# Patient Record
Sex: FEMALE | Race: WHITE | NOT HISPANIC OR LATINO | Employment: OTHER | URBAN - METROPOLITAN AREA
[De-identification: names, ages, dates, MRNs, and addresses within clinical notes are randomized per-mention and may not be internally consistent; named-entity substitution may affect disease eponyms.]

---

## 2017-11-27 ENCOUNTER — OFFICE VISIT (OUTPATIENT)
Dept: OTOLARYNGOLOGY | Facility: CLINIC | Age: 72
End: 2017-11-27
Payer: MEDICARE

## 2017-11-27 VITALS — BODY MASS INDEX: 36.9 KG/M2 | WEIGHT: 208.31 LBS

## 2017-11-27 DIAGNOSIS — Z79.01 ANTICOAGULATED: ICD-10-CM

## 2017-11-27 DIAGNOSIS — H93.13 TINNITUS OF BOTH EARS: ICD-10-CM

## 2017-11-27 DIAGNOSIS — R04.0 EPISTAXIS: Primary | ICD-10-CM

## 2017-11-27 PROCEDURE — 99213 OFFICE O/P EST LOW 20 MIN: CPT | Mod: PBBFAC | Performed by: PHYSICIAN ASSISTANT

## 2017-11-27 PROCEDURE — 99999 PR PBB SHADOW E&M-EST. PATIENT-LVL III: CPT | Mod: PBBFAC,,, | Performed by: PHYSICIAN ASSISTANT

## 2017-11-27 PROCEDURE — 99204 OFFICE O/P NEW MOD 45 MIN: CPT | Mod: S$PBB,,, | Performed by: PHYSICIAN ASSISTANT

## 2017-11-27 RX ORDER — ISOSORBIDE MONONITRATE 30 MG/1
TABLET, EXTENDED RELEASE ORAL
COMMUNITY
Start: 2015-12-07 | End: 2021-04-14

## 2017-11-27 RX ORDER — IMIPRAMINE HYDROCHLORIDE 50 MG/1
50 TABLET, FILM COATED ORAL NIGHTLY
COMMUNITY
End: 2021-04-14

## 2017-11-27 RX ORDER — LEVOCETIRIZINE DIHYDROCHLORIDE 5 MG/1
5 TABLET, FILM COATED ORAL NIGHTLY
COMMUNITY

## 2017-11-27 RX ORDER — MUPIROCIN 20 MG/G
OINTMENT TOPICAL 2 TIMES DAILY
Qty: 15 G | Refills: 3 | Status: SHIPPED | OUTPATIENT
Start: 2017-11-27 | End: 2017-12-07

## 2017-11-27 RX ORDER — LISINOPRIL AND HYDROCHLOROTHIAZIDE 12.5; 2 MG/1; MG/1
1 TABLET ORAL DAILY
COMMUNITY
End: 2021-04-14

## 2017-11-27 RX ORDER — CLOPIDOGREL BISULFATE 75 MG/1
1 TABLET ORAL DAILY
COMMUNITY
Start: 2015-09-01

## 2017-11-27 RX ORDER — ESTRADIOL 0.5 MG/1
0.5 TABLET ORAL DAILY
COMMUNITY
End: 2021-04-14

## 2017-11-27 NOTE — PROGRESS NOTES
"Subjective:       Patient ID: Alexandrea Butt is a 72 y.o. female.    Chief Complaint: Epistaxis    Patient is a very pleasant 72 year old female here to see me today for the first time for evaluation of epistaxis.  Epistaxis-    History of intermittent bleeding several months or years:  YES past 12 months  Laterality:  bilateral  Current bleeding has been going on for - last episode yesterday  Bleeding isolated to blood in mucus or on tissues:  Yes  Bleeding more than 1/4 cup of blood at any isolated bleed:  No   Ever required a blood transfusion due to nose bleeds:  NO  Primarily Posterior Bleeding:  No  History of coagulation disorders/bleeding when having teeth cleaning:  No  Currently on anticoagulant medications:   YES aspirin and Plavix  Blood pressure:   BP Readings from Last 3 Encounters:  02/11/15 : 132/79  01/07/15 : (!) 158/84    Denies nasal trauma.  Denies previous nasal packing or cauterization.  She's been using Flonase daily for past six months.  She also complains of tinnitus AU for years, "like music."  Denies ear pain or drainage.  Denies previous otologic surgery.  Denies family history of hearing loss.  Denies hearing loss; denies having a better hearing ear; denies loud noise exposure.  She denies dizziness but says she feels "empty headed" at times.  She does not smoke.            Review of Systems   Constitutional: Negative for activity change, appetite change and fever.   HENT: Positive for congestion (sometimes), nosebleeds, postnasal drip (occasional with throat clearing), rhinorrhea and tinnitus (AU for years). Negative for ear discharge, ear pain, hearing loss, sinus pain, sinus pressure, sneezing, sore throat, trouble swallowing and voice change.    Eyes: Negative for discharge.   Respiratory: Positive for cough (sometimes) and shortness of breath (with exertion).    Cardiovascular: Negative for chest pain and palpitations.   Gastrointestinal: Negative for diarrhea, nausea and vomiting. "   Musculoskeletal: Positive for arthralgias. Negative for neck pain.   Allergic/Immunologic: Negative for food allergies.   Neurological: Positive for headaches (occasional, better with Lisinopril). Negative for light-headedness.   Hematological: Negative for adenopathy.   Psychiatric/Behavioral: Positive for sleep disturbance.       Objective:      Physical Exam   Constitutional: She is oriented to person, place, and time. She appears well-developed and well-nourished. No distress.   HENT:   Head: Normocephalic and atraumatic.   Right Ear: Tympanic membrane, external ear and ear canal normal.   Left Ear: Tympanic membrane, external ear and ear canal normal.   Nose: Nose normal. No mucosal edema, rhinorrhea, nasal deformity or septal deviation. No epistaxis. Right sinus exhibits no maxillary sinus tenderness and no frontal sinus tenderness. Left sinus exhibits no maxillary sinus tenderness and no frontal sinus tenderness.   Mouth/Throat: Uvula is midline, oropharynx is clear and moist and mucous membranes are normal. Mucous membranes are not pale and not dry. Normal dentition. No oropharyngeal exudate or posterior oropharyngeal erythema.   Eyes: Conjunctivae, EOM and lids are normal. Pupils are equal, round, and reactive to light. Right eye exhibits no chemosis. Left eye exhibits no chemosis. Right conjunctiva is not injected. Left conjunctiva is not injected. No scleral icterus. Right eye exhibits normal extraocular motion and no nystagmus. Left eye exhibits normal extraocular motion and no nystagmus.   Neck: Trachea normal and phonation normal. No tracheal tenderness present. No tracheal deviation present. No thyroid mass and no thyromegaly present.   Cardiovascular: Normal rate and intact distal pulses.    Pulmonary/Chest: Effort normal. No stridor. No respiratory distress.   Abdominal: She exhibits no distension.   Lymphadenopathy:        Head (right side): No submental, no submandibular, no preauricular and no  posterior auricular adenopathy present.        Head (left side): No submental, no submandibular, no preauricular and no posterior auricular adenopathy present.     She has no cervical adenopathy.   Neurological: She is alert and oriented to person, place, and time. No cranial nerve deficit.   Skin: Skin is warm and dry. No rash noted. No erythema.   Psychiatric: She has a normal mood and affect. Her behavior is normal.       Assessment:       1. Epistaxis    2. Anticoagulated    3. Tinnitus of both ears        Plan:           Epistaxis: She was given a handout detailing different strategies to help increase her nasal moisture, including the use of saline spray throughout the day and a humidifier at night. In addition, I gave the patient a prescription for Bactroban to be used twice daily for two weeks, and she may use Afrin as needed for active bleeding.  Discussed that nosebleeds are more common with patients on anticoagulation.  Recommend she maintain intranasal moisture and adequate blood pressure control and RTC with any further epistaxis.    Tinnitus:  Recommend scheduling an audiogram at her convenience and I'll review those results once available.  We also discussed that tinnitus is most often caused by a hearing loss, and that as the hair cells are damaged, either genetic or as a result of loud noise exposure, they then cause tinnitus.  Some patients find that restricting the salt or caffeine in their diet helps, and there is also an OTC supplement, lipoflavinoids, that some people find to be effective though their benefit is not fully proven.  Tinnitus tends to be louder in times of stress and fatigue, and may decrease with time.  Sound machines may also be an effective masking technique if needed at night.

## 2017-11-27 NOTE — PATIENT INSTRUCTIONS
Nosebleeds most frequently occurred due to drying of the nasal passages and even minor trauma to the tissue within the nose.  Most importantly, avoiding excessive manipulation or picking at the nose frequently will result in resolution of her current nosebleeds.  Additionally, high blood pressure or medications that thin the blood may result in recurrent or persistent nosebleeds.  Discussed below her management options for active bleeding as well as preventative measures.  If you continue to have significant nosebleeds over the course of trying these measures for 2-3 weeks, we will need follow up with Dr. Cunningham.      If you have significant bleeding from the nose at home, blow the nose once then spray both sides of the nose with 2 sprays of Afrin which can be obtained over-the-counter.  Following this hold pressure with an ice pack continuously, without checking intermittently to see if the bleeding is stopped, for 20 minutes.  If you have persistent bleeding despite this at the end of 20 minutes, you'll need to see a physician in the emergency room.      Preventive measures may be taken to decrease the frequency or severity of nosebleeds:  1. Avoid picking the nose or aggressive wiping   2. Ayr gel- this is a gelatinized saline that can be placed in the nose nightly to improve moisturization  3. Ocean nasal spray (saline spray) 2-3 times daily  4. [facility] Med sinus rinse-  If you have excessive buildup of crusting and mucus, do not try to remove this with your hands as this will lead to more bleeding.  You may try nasal irrigations with saline to clear these crusts and secretions.                  DIRECTIONS FOR SINUS SALINE RINSE To see demonstration: Enter [email or web address]?v=[email or web address] into the browser address box, or go to You tube, and under the search box, enter sinus rinse. Click on [facility] Sinus Rinse Video    Step 1    Step 1 Please wash your hands. Fill the clean bottle with the designated  volume of warm distilled water, filtered water or previously boiled water. You may warm the water in a microwave but we recommend that you warm it in increments of five seconds. This is to avoid excessive heating of the water and damage to the device or scalding your nasal passage.    Step 2    Step 2 Cut the SINUS RINSE mixture packet at the corner and pour its contents into the bottle. Tighten the cap & tube on the bottle securely, place one finger over the tip of the cap and shake the bottle gently to dissolve the mixture.      Step 3    Step 3 Standing in front of a sink, bend forward to your comfort level and tilt your head down. Keeping your mouth open without holding your breath, place cap snugly against your nasal passage and SQUEEZE BOTTLE GENTLY until the solution starts draining from the OPPOSITE nasal passage or from your mouth. Keep squeezing the bottle GENTLY until at least 1/4 to 1/2 (60 to 120 mL) of the bottle is used for a proper rinse. Do not swallow the solution.    Step 4    Blow your nose gently, without pinching your nose completely because this will apply pressure on the    eardrums. If tolerable, sniff in any residual solution remaining in the nasal passage once or twice prior to    blowing your nose as this may clean out the posterior nasopharyngeal area (the area at the back of your    nasal passage). Some solution will reach the back of the throat, so please spit it out. To help improve    drainage of any residual solution, blow your nose gently while tilting your head to the opposite side of    the nasal passage that you just rinsed.    Step 5    Now repeat steps 3 & 4 for your other nasal passage.    Step 6 Air dry the Sinus Rinse bottle, cap, and tube on a clean paper towel, a glass plate to store the bottle cap and tube. If there is any solution leftover, please discard it. We recommend you make a fresh solution each time you rinse. Rinse 5 times each day, OR as directed by your  physician. Warnings: DO NOT RINSE IF NASAL PASSAGE IS COMPLETELY BLOCKED OR IF YOU HAVE AN EAR INFECTION OR BLOCKED EARS. If you have had ear surgery, please contact your physician prior to irrigation. If you experience any pressure in your ears, stop the rinse and get further directions from your physician or contact our office during regular business hours. To avoid any ear discomfort: Heat the solution to lukewarm, do not use hot, boiling or cold water. Keep your mouth open. Do not hold your breath and if possible make the sound YONI....YONI... Make sure to take the position as shown. Gently squeeze 1/4 of the bottle at a time (60mL / 2 ounces). Stop the rinse if you feel any solution sensation near the ears. You may rinse with a partially blocked nasal passage. Please do not use for any other purposes. Please rinse at least ONE HOUR PRIOR to bedtime, in order to avoid any residual solution dripping in the throat.    >> Before using the SINUS RINSE kit, please inspect the cap, tube and bottle carefully for wear and    tear. If any of the components appear discolored or cracked, please contact [employer] to obtain a    replacement. You must follow the cleaning instructions provided in this brochure or cleaning instruction    card prior to each use.    >> The SINUS RINSE bottle and mixture are to be used only for nasalirrigation. Do not use for any other    purposes.    >> We recommend that you use the rinse ONE HOUR PRIOR to bedtime in order to avoid any residual    solution dripping in the throat.    Tips to avoid ear discomfort while rinsing    If you have had ear surgery, please contact your physician prior to irrigation. Do not use if you have an    ear infection or blocked ears. Rinse with lukewarm water. Keep your mouth open. Do not hold your    breath while rinsing. While rinsing, make sure to tilt your. Gently squeeze the bottle while rinsing; do    not squeeze the bottle very forcefully. Stop the rinse if  you feel a sensation of fluid near your ears.    Tips to avoid unexpected drainage after rinsing    In rare situations, especially if you have had sinus surgery, the saline solution can pool in the sinus    cavities and nasal passages and then drip from your nostrils hours after rinsing. To avoid this harmless    but annoying inconvenience, take one extra step after rinsing: lean forward, tilt your head sideways and    gently blow your nose. Then, tilt your head to the other side and blow again. You may need to repeat    this several times. This will help rid your nasal passages of any excess mucus and remaining saline    solution. If you find yourself experiencing delayed drainage often, do not rinse right before leaving your    house or going to bed.

## 2017-12-05 ENCOUNTER — TELEPHONE (OUTPATIENT)
Dept: OBSTETRICS AND GYNECOLOGY | Facility: CLINIC | Age: 72
End: 2017-12-05

## 2017-12-29 ENCOUNTER — CLINICAL SUPPORT (OUTPATIENT)
Dept: AUDIOLOGY | Facility: CLINIC | Age: 72
End: 2017-12-29
Payer: OTHER GOVERNMENT

## 2017-12-29 DIAGNOSIS — H93.13 TINNITUS, BILATERAL: ICD-10-CM

## 2017-12-29 DIAGNOSIS — H90.3 SENSORINEURAL HEARING LOSS, BILATERAL: Primary | ICD-10-CM

## 2017-12-29 PROCEDURE — 92567 TYMPANOMETRY: CPT | Mod: PBBFAC | Performed by: AUDIOLOGIST-HEARING AID FITTER

## 2017-12-29 PROCEDURE — 92557 COMPREHENSIVE HEARING TEST: CPT | Mod: PBBFAC | Performed by: AUDIOLOGIST-HEARING AID FITTER

## 2017-12-29 NOTE — PROGRESS NOTES
Referring provider: Gaby Nevarez PA-C    Alexandrea Butt was seen 12/29/2017 for an audiological evaluation.  Patient complains of bilateral tinnitus for over ten years.  She describes constant high-pitched tones that get louder when in quiet.  She must sleep with fan noise.  She denies hearing loss or dizziness.  She notes many years ago in [place], had shots on each side of her neck for the tinnitus with no benefit.     Results reveal bilateral symmetric mild-to-severe sensorineural hearing loss 250-8000 Hz.  Speech Reception Thresholds were 30 dBHL for each ear.  Word recognition scores were excellent for the right ear and good for the left ear.   Tympanograms were Type A, normal for the right ear and Type A, normal for the left ear.    Patient was counseled on the above findings.    Recommendations:  1. Annual audiograms  2. Hearing aids with tinnitus noise management, binaural, when motivated.  Patient is not interested and denies hearing handicap.  3. Continue with tinnitus masking for sleep as needed

## 2018-04-13 ENCOUNTER — TELEPHONE (OUTPATIENT)
Dept: OBSTETRICS AND GYNECOLOGY | Facility: CLINIC | Age: 73
End: 2018-04-13

## 2018-10-11 ENCOUNTER — OFFICE VISIT (OUTPATIENT)
Dept: ALLERGY | Facility: CLINIC | Age: 73
End: 2018-10-11
Payer: MEDICARE

## 2018-10-11 ENCOUNTER — HOSPITAL ENCOUNTER (OUTPATIENT)
Dept: RADIOLOGY | Facility: HOSPITAL | Age: 73
Discharge: HOME | End: 2018-10-11
Attending: NURSE PRACTITIONER
Payer: MEDICARE

## 2018-10-11 VITALS
BODY MASS INDEX: 37.57 KG/M2 | TEMPERATURE: 98 F | SYSTOLIC BLOOD PRESSURE: 150 MMHG | RESPIRATION RATE: 15 BRPM | HEART RATE: 78 BPM | DIASTOLIC BLOOD PRESSURE: 80 MMHG | WEIGHT: 212.06 LBS | HEIGHT: 63 IN

## 2018-10-11 VITALS — HEIGHT: 63 IN | WEIGHT: 212 LBS | BODY MASS INDEX: 37.56 KG/M2

## 2018-10-11 DIAGNOSIS — Z12.31 VISIT FOR SCREENING MAMMOGRAM: ICD-10-CM

## 2018-10-11 DIAGNOSIS — Z78.0 ASYMPTOMATIC AGE-RELATED POSTMENOPAUSAL STATE: ICD-10-CM

## 2018-10-11 DIAGNOSIS — L30.9 DERMATITIS: Primary | ICD-10-CM

## 2018-10-11 PROCEDURE — 77067 SCR MAMMO BI INCL CAD: CPT | Mod: 26,,, | Performed by: RADIOLOGY

## 2018-10-11 PROCEDURE — 77063 BREAST TOMOSYNTHESIS BI: CPT | Mod: 26,,, | Performed by: RADIOLOGY

## 2018-10-11 PROCEDURE — 77063 BREAST TOMOSYNTHESIS BI: CPT | Mod: TC,PO

## 2018-10-11 PROCEDURE — 99213 OFFICE O/P EST LOW 20 MIN: CPT | Mod: PBBFAC,25 | Performed by: ALLERGY & IMMUNOLOGY

## 2018-10-11 PROCEDURE — 99999 PR PBB SHADOW E&M-EST. PATIENT-LVL III: CPT | Mod: PBBFAC,,, | Performed by: ALLERGY & IMMUNOLOGY

## 2018-10-11 PROCEDURE — 99204 OFFICE O/P NEW MOD 45 MIN: CPT | Mod: S$GLB,,, | Performed by: ALLERGY & IMMUNOLOGY

## 2018-10-11 RX ORDER — GLYCOPYRROLATE 1 MG/1
1 TABLET ORAL
COMMUNITY
End: 2021-07-20

## 2018-10-11 RX ORDER — AMLODIPINE BESYLATE 10 MG/1
TABLET ORAL
COMMUNITY
Start: 2018-08-20

## 2018-10-11 RX ORDER — MUPIROCIN 20 MG/G
OINTMENT TOPICAL 2 TIMES DAILY
COMMUNITY
End: 2021-07-20

## 2018-10-11 RX ORDER — ACETAMINOPHEN 500 MG
1 TABLET ORAL DAILY
COMMUNITY

## 2018-10-11 RX ORDER — MIRTAZAPINE 15 MG/1
15 TABLET, FILM COATED ORAL NIGHTLY
COMMUNITY
End: 2021-07-20

## 2018-10-11 RX ORDER — ONDANSETRON 4 MG/1
TABLET, FILM COATED ORAL
COMMUNITY
Start: 2018-08-20

## 2018-10-11 RX ORDER — FLUTICASONE PROPIONATE 50 MCG
1 SPRAY, SUSPENSION (ML) NASAL 2 TIMES DAILY
COMMUNITY
End: 2021-07-20

## 2018-10-12 NOTE — PROGRESS NOTES
"Chief complaint: Rash    This note was dictated using voice recognition software and may contain errors.    History:     She had a 9:00 a.m. Appointment on Thursday October 11, 2018. Information in her medical record regarding her past medical history family history and social history was reviewed and updated today.  Significant addition see if any are as noted below.      Prior to her appointment today I received by means of FAX  Transmission copies medical records from her primary care doctor's office.  She informed me her primary physician is Rah Leon M.D. Working in the office with him MARIA DEL CARMEN Seo. I reviewed the copies of the records in her presence while she was here today.      She is uncertain as to the exact date of onset of her dermatitis.  She believes may be the dermatitis began in late August.  Initially she experienced the development of "bumps" on her anterior thighs with associated itching.  Subsequently she developed dermatitis on her arms breast skin of the buttocks and thighs.  No blisters developed.  She did not developed inflammation of the eyes or mucous membranes.  She stated for the past 2 weeks she has been experiencing peeling of her skin.  She stated prior to the onset of the dermatitis she did not take any new medication.  She denied using topical products on her skin prior to the onset of the dermatitis.  She denied being ill, for example with a viral illness, prior to the onset of the dermatitis.    She did not experience any involvement of the skin of the scalp.  She stated it felt as though in the skin of her finger tips sand was present and she perceives some transient numbness in the skin on the tips of her fingers.      Early on in the development of the dermatitis she stated she felt that she may have had fever for about 2 days.  Also initially she experienced some diarrhea and some abdominal discomfort.      She has taken oral antihistamines.  She has been " using Aveeno topically.  In recent weeks she has been using a Gold Bond topical product.    As of this time she has not seen a dermatologist.      Her past medical history is significant.  She stated that she experienced a myocardial infarction in 2014.  She stated she had the placement of 2 stents in her coronary arteries at that time.      Social history:  She is a native of the [place].  English is not her native language.  We were able to communicate in a satisfactory fashion.    Review of systems:  At the time of her appointment this morning she is feeling well in general in had no additional new symptoms dimension.      Exam:     In general she is in no distress.  She is alert oriented well groomed well-developed in good mood.  She is overweight   Gait steady  Head no swelling noted  Skin no urticaria noted.  No active dermatitis noted.  No vesicles noted.  Superficial desquamation of skin noted on the trunk and extremities and skin of the hands.    Eyes scleral white conjunctiva pink  Nose patent no polyp seen   Mouth no inflammation or swelling of the lips tongue or in the throat noted   Ears not inflamed tympanic membranes not inflamed  Neck no masses or thyromegaly noted  Lymph nodes no significant cervical or epitrochlear lymphadenopathy noted  Lungs clear to auscultation  Heart no murmurs heard regular rhythm  Extremities no joint inflammation or swelling of the hands or feet noted    Impression:     1. Dermatitis with associated itching, improving now with superficial desquamation, cause uncertain  2. Coronary artery disease  3. History of myocardial infarction  4. Hypertension receiving treatment  5. Multiple other health concerns as noted in her medical record     Assessment and plan:     I told her I did not know with certainty why her dermatitis developed.  In the future, should any dermatitis redevelop I recommended that she seek prompt evaluation by Dermatology.  I told her that a  dermatologist sometimes may recommend that a skin biopsy be performed to assist with establishing a diagnosis of  Dermatitis.    Her appointment was 60 min in duration spent entirely in face-to-face contact.  More than 50% of the visit was spent in counseling and coordination of care and in reviewing outside medical records.      In the afternoon of Thursday, October 11, 2018, I did call the office  and was able to speak with MARIA DEL CARMEN [place]. I told her should her dermatitis redevelop in the future it is my recommendation she be evaluated by a dermatologist, as noted above.    She was given the office phone number.  Should she have additional questions or concerns she may call.

## 2018-10-29 ENCOUNTER — OFFICE VISIT (OUTPATIENT)
Dept: DERMATOLOGY | Facility: CLINIC | Age: 73
End: 2018-10-29
Payer: OTHER GOVERNMENT

## 2018-10-29 DIAGNOSIS — L85.3 XEROSIS CUTIS: Primary | ICD-10-CM

## 2018-10-29 PROCEDURE — 99212 OFFICE O/P EST SF 10 MIN: CPT | Mod: PBBFAC,PO | Performed by: STUDENT IN AN ORGANIZED HEALTH CARE EDUCATION/TRAINING PROGRAM

## 2018-10-29 PROCEDURE — 99999 PR PBB SHADOW E&M-EST. PATIENT-LVL II: CPT | Mod: PBBFAC,,, | Performed by: STUDENT IN AN ORGANIZED HEALTH CARE EDUCATION/TRAINING PROGRAM

## 2018-10-29 PROCEDURE — 99201 PR OFFICE/OUTPT VISIT,NEW,LEVL I: CPT | Mod: S$GLB,,, | Performed by: STUDENT IN AN ORGANIZED HEALTH CARE EDUCATION/TRAINING PROGRAM

## 2018-10-29 RX ORDER — UREA 200 MG/G
CREAM TOPICAL
Qty: 85 G | Refills: 2 | COMMUNITY
Start: 2018-10-29

## 2018-10-29 NOTE — PATIENT INSTRUCTIONS
XEROSIS (DRY SKIN)        1. Definition    Xerosis is the term for dry skin.  We all have a natural oil coating over our skin produced by the skin oil glands.  If this oil is removed, the skin becomes dry which can lead to cracking, which can lead to inflammation.  Xerosis is usually a long-term problem that recurs often, especially in the winter.    2. Cause     Long hot baths or showers can remove our natural oil and lead to xerosis.  One should never take more than one bath or shower a day and for no longer than ten minutes.   Use of harsh soaps such as Zest, Dial, and Ivory can worsen and cause xerosis.   Cold winter weather worsens xerosis because the amount of moisture contained in cold air is much less than the amount of moisture in warm air.    3. Treatment     Treatment is intended to restore the natural oil to your skin.  Keep the skin lubricated.     Do not take more than one bath or shower a day.  Use lukewarm water, not hot.  Hot water dries out the skin.     Use a gentle moisturizing soap such as Cetaphil soap, Oil of Olay, Dove, Basis, Ivory moisture care, Restoraderm cleanser.     When toweling dry, dont rub.  Blot the skin so there is still some water left on the skin.  You should apply a moisturizing cream to all of the skin such as Cerave cream, Cetaphil cream, Restoraderm or Eucerin Original Formula cream.   Alpha hydroxyacid lotions, i.e., AmLactin, also work very well for preventing dry skin, but may burn when used on inflamed or reddened skin.     If you like to swim during the winter months, you should not use soap when getting out of the pool.  When you have finished swimming, rinse off the chlorine with cool to warm water.  If this will be the only shower of the day, then you may use Cetaphil or another mild soap to cleanse your skin.  After the shower, apply a moisturizing cream to all of the skin as above.

## 2018-10-29 NOTE — PROGRESS NOTES
Subjective:       Patient ID:  Alexandrea Butt is a 73 y.o. female who presents for   Chief Complaint   Patient presents with    Dry Skin     c/o dry skin to both legs that itch tx goldbond      History of Present Illness: The patient presents with chief complaint of rash  Location: mostly to both legs, dry skin noted to back and small amount to arms   Duration: unsure  Signs/Symptoms: itch at times   Prior treatments: gold bond healing.     She reports that she developed an itchy red rash on the legs and arms and was given an antihistamine by her PCP which helped to resolve the rash. She is no longer taking the antihistamine and reports that the rash has not recurred. Now her main complaint is dryness of the legs. She denies any triggers for the rash.                Review of Systems   Skin: Positive for dry skin. Negative for itching and rash.        Objective:    Physical Exam   Constitutional: She appears well-developed and well-nourished. No distress.   Neurological: She is alert and oriented to person, place, and time. She is not disoriented.   Psychiatric: She has a normal mood and affect.   Skin:   Areas Examined (abnormalities noted in diagram):   Scalp / Hair Palpated and Inspected  Head / Face Inspection Performed  Neck Inspection Performed  RUE Inspected  LUE Inspection Performed  RLE Inspected  LLE Inspection Performed              Diagram Legend     Erythematous scaling macule/papule c/w actinic keratosis       Vascular papule c/w angioma      Pigmented verrucoid papule/plaque c/w seborrheic keratosis      Yellow umbilicated papule c/w sebaceous hyperplasia      Irregularly shaped tan macule c/w lentigo     1-2 mm smooth white papules consistent with Milia      Movable subcutaneous cyst with punctum c/w epidermal inclusion cyst      Subcutaneous movable cyst c/w pilar cyst      Firm pink to brown papule c/w dermatofibroma      Pedunculated fleshy papule(s) c/w skin tag(s)      Evenly pigmented macule  c/w junctional nevus     Mildly variegated pigmented, slightly irregular-bordered macule c/w mildly atypical nevus      Flesh colored to evenly pigmented papule c/w intradermal nevus       Pink pearly papule/plaque c/w basal cell carcinoma      Erythematous hyperkeratotic cursted plaque c/w SCC      Surgical scar with no sign of skin cancer recurrence      Open and closed comedones      Inflammatory papules and pustules      Verrucoid papule consistent consistent with wart     Erythematous eczematous patches and plaques     Dystrophic onycholytic nail with subungual debris c/w onychomycosis     Umbilicated papule    Erythematous-base heme-crusted tan verrucoid plaque consistent with inflamed seborrheic keratosis     Erythematous Silvery Scaling Plaque c/w Psoriasis     See annotation      Assessment / Plan:        Xerosis cutis - previous rash resolved, now with xerosis.   -     urea 20 % Crea; Apply topically to affected area.  Dispense: 85 g; Refill: 2  -     Counseled on dry skin precautions   -     Patient to call for appointment if rash recurs.        Follow-up in about 6 months (around 4/29/2019).

## 2019-08-19 ENCOUNTER — TELEPHONE (OUTPATIENT)
Dept: OBSTETRICS AND GYNECOLOGY | Facility: CLINIC | Age: 74
End: 2019-08-19

## 2019-11-06 ENCOUNTER — TELEPHONE (OUTPATIENT)
Dept: FAMILY MEDICINE | Facility: CLINIC | Age: 74
End: 2019-11-06

## 2019-11-06 NOTE — TELEPHONE ENCOUNTER
----- Message from Maryann Britton sent at 11/6/2019  1:25 PM CST -----  Contact: self- [phone]  .Type:  Mammogram    Caller is requesting to schedule their annual mammogram appointment.  Order is not listed in EPIC.  Please enter order and contact patient to schedule.  Name of Caller:Alexandrea Butt  Where would they like the mammogram performed?[facility]   Would the patient rather a call back or a response via [facility]? Call back   Best Call Back Number:.[phone] (home)   Additional Information:

## 2019-11-20 ENCOUNTER — TELEPHONE (OUTPATIENT)
Dept: OBSTETRICS AND GYNECOLOGY | Facility: CLINIC | Age: 74
End: 2019-11-20

## 2019-11-20 NOTE — TELEPHONE ENCOUNTER
----- Message from Aurelia Zafar sent at 11/19/2019  2:54 PM CST -----  Contact: pt  Patient would like to get mammogram orders and can be reached at [phone]        Thanks,  Aurelia Zafar

## 2019-11-20 NOTE — TELEPHONE ENCOUNTER
Returned call, she requested a mammo appt, but message was sent to the wrong department.  She has never been seen at our clinic.  Offered to schedule with gynecology, she verbalized understanding of offer, but message was for another provider.  She is going to call back and ask for provider.

## 2019-12-18 ENCOUNTER — HOSPITAL ENCOUNTER (OUTPATIENT)
Dept: RADIOLOGY | Facility: HOSPITAL | Age: 74
Discharge: HOME | End: 2019-12-18
Attending: SPECIALIST
Payer: MEDICARE

## 2019-12-18 VITALS — BODY MASS INDEX: 37.57 KG/M2 | WEIGHT: 212.06 LBS | HEIGHT: 63 IN

## 2019-12-18 DIAGNOSIS — Z12.31 VISIT FOR SCREENING MAMMOGRAM: ICD-10-CM

## 2019-12-18 PROCEDURE — 77063 MAMMO DIGITAL SCREENING BILAT WITH TOMOSYNTHESIS_CAD: ICD-10-PCS | Mod: 26,,, | Performed by: RADIOLOGY

## 2019-12-18 PROCEDURE — 77063 BREAST TOMOSYNTHESIS BI: CPT | Mod: 26,,, | Performed by: RADIOLOGY

## 2019-12-18 PROCEDURE — 77067 SCR MAMMO BI INCL CAD: CPT | Mod: TC

## 2019-12-18 PROCEDURE — 77067 MAMMO DIGITAL SCREENING BILAT WITH TOMOSYNTHESIS_CAD: ICD-10-PCS | Mod: 26,,, | Performed by: RADIOLOGY

## 2019-12-18 PROCEDURE — 77067 SCR MAMMO BI INCL CAD: CPT | Mod: 26,,, | Performed by: RADIOLOGY

## 2019-12-30 ENCOUNTER — TELEPHONE (OUTPATIENT)
Dept: DIABETES | Facility: CLINIC | Age: 74
End: 2019-12-30

## 2019-12-30 NOTE — TELEPHONE ENCOUNTER
Patient called saying she wants to have an education visit scheduled with SIOBHAN Rodriguez RD. Advised patient Dr. Leon will need to fax over a referral for her insurance to pay for the appt with the dietitian. Fax number given. Patient voiced understanding.

## 2020-03-20 DIAGNOSIS — R06.89 DYSPNEA AND RESPIRATORY ABNORMALITIES: ICD-10-CM

## 2020-03-20 DIAGNOSIS — N18.30 CKD (CHRONIC KIDNEY DISEASE) STAGE 3, GFR 30-59 ML/MIN: ICD-10-CM

## 2020-03-20 DIAGNOSIS — N28.9 RENAL INSUFFICIENCY: Primary | ICD-10-CM

## 2020-03-20 DIAGNOSIS — R06.00 DYSPNEA AND RESPIRATORY ABNORMALITIES: ICD-10-CM

## 2020-03-25 ENCOUNTER — PATIENT MESSAGE (OUTPATIENT)
Dept: NEPHROLOGY | Facility: CLINIC | Age: 75
End: 2020-03-25

## 2020-03-27 ENCOUNTER — LAB VISIT (OUTPATIENT)
Dept: LAB | Facility: HOSPITAL | Age: 75
End: 2020-03-27
Attending: INTERNAL MEDICINE
Payer: MEDICARE

## 2020-03-27 DIAGNOSIS — N28.9 RENAL INSUFFICIENCY: ICD-10-CM

## 2020-03-27 DIAGNOSIS — N18.30 CKD (CHRONIC KIDNEY DISEASE) STAGE 3, GFR 30-59 ML/MIN: ICD-10-CM

## 2020-03-27 LAB
25(OH)D3+25(OH)D2 SERPL-MCNC: 94 NG/ML (ref 30–96)
ALBUMIN SERPL BCP-MCNC: 3.9 G/DL (ref 3.5–5.2)
ANION GAP SERPL CALC-SCNC: 11 MMOL/L (ref 8–16)
BASOPHILS # BLD AUTO: 0.03 K/UL (ref 0–0.2)
BASOPHILS NFR BLD: 0.6 % (ref 0–1.9)
BUN SERPL-MCNC: 26 MG/DL (ref 8–23)
CALCIUM SERPL-MCNC: 8.1 MG/DL (ref 8.7–10.5)
CHLORIDE SERPL-SCNC: 104 MMOL/L (ref 95–110)
CO2 SERPL-SCNC: 28 MMOL/L (ref 23–29)
CREAT SERPL-MCNC: 1.6 MG/DL (ref 0.5–1.4)
DIFFERENTIAL METHOD: ABNORMAL
EOSINOPHIL # BLD AUTO: 0.1 K/UL (ref 0–0.5)
EOSINOPHIL NFR BLD: 2.7 % (ref 0–8)
ERYTHROCYTE [DISTWIDTH] IN BLOOD BY AUTOMATED COUNT: 13.8 % (ref 11.5–14.5)
EST. GFR  (AFRICAN AMERICAN): 36.6 ML/MIN/1.73 M^2
EST. GFR  (NON AFRICAN AMERICAN): 31.7 ML/MIN/1.73 M^2
GLUCOSE SERPL-MCNC: 92 MG/DL (ref 70–110)
HCT VFR BLD AUTO: 41 % (ref 37–48.5)
HGB BLD-MCNC: 12.5 G/DL (ref 12–16)
IMM GRANULOCYTES # BLD AUTO: 0.01 K/UL (ref 0–0.04)
IMM GRANULOCYTES NFR BLD AUTO: 0.2 % (ref 0–0.5)
LYMPHOCYTES # BLD AUTO: 1.4 K/UL (ref 1–4.8)
LYMPHOCYTES NFR BLD: 26.7 % (ref 18–48)
MCH RBC QN AUTO: 27.9 PG (ref 27–31)
MCHC RBC AUTO-ENTMCNC: 30.5 G/DL (ref 32–36)
MCV RBC AUTO: 92 FL (ref 82–98)
MONOCYTES # BLD AUTO: 0.4 K/UL (ref 0.3–1)
MONOCYTES NFR BLD: 8 % (ref 4–15)
NEUTROPHILS # BLD AUTO: 3.3 K/UL (ref 1.8–7.7)
NEUTROPHILS NFR BLD: 61.8 % (ref 38–73)
NRBC BLD-RTO: 0 /100 WBC
PHOSPHATE SERPL-MCNC: 4.9 MG/DL (ref 2.7–4.5)
PLATELET # BLD AUTO: 217 K/UL (ref 150–350)
PMV BLD AUTO: 10.8 FL (ref 9.2–12.9)
POTASSIUM SERPL-SCNC: 4.1 MMOL/L (ref 3.5–5.1)
PTH-INTACT SERPL-MCNC: <5 PG/ML (ref 9–77)
RBC # BLD AUTO: 4.48 M/UL (ref 4–5.4)
SODIUM SERPL-SCNC: 143 MMOL/L (ref 136–145)
WBC # BLD AUTO: 5.25 K/UL (ref 3.9–12.7)

## 2020-03-27 PROCEDURE — 83970 ASSAY OF PARATHORMONE: CPT

## 2020-03-27 PROCEDURE — 80069 RENAL FUNCTION PANEL: CPT

## 2020-03-27 PROCEDURE — 85025 COMPLETE CBC W/AUTO DIFF WBC: CPT

## 2020-03-27 PROCEDURE — 36415 COLL VENOUS BLD VENIPUNCTURE: CPT | Mod: PO

## 2020-03-27 PROCEDURE — 82306 VITAMIN D 25 HYDROXY: CPT

## 2020-03-30 ENCOUNTER — OFFICE VISIT (OUTPATIENT)
Dept: NEPHROLOGY | Facility: CLINIC | Age: 75
End: 2020-03-30
Payer: MEDICARE

## 2020-03-30 ENCOUNTER — PATIENT MESSAGE (OUTPATIENT)
Dept: NEPHROLOGY | Facility: CLINIC | Age: 75
End: 2020-03-30

## 2020-03-30 DIAGNOSIS — R74.8: Primary | ICD-10-CM

## 2020-03-30 DIAGNOSIS — R79.89 ELEVATED SERUM CREATININE: ICD-10-CM

## 2020-03-30 PROCEDURE — 1159F PR MEDICATION LIST DOCUMENTED IN MEDICAL RECORD: ICD-10-PCS | Mod: 95,S$GLB,, | Performed by: INTERNAL MEDICINE

## 2020-03-30 PROCEDURE — 1159F MED LIST DOCD IN RCRD: CPT | Mod: 95,S$GLB,, | Performed by: INTERNAL MEDICINE

## 2020-03-30 PROCEDURE — 99205 PR OFFICE/OUTPT VISIT, NEW, LEVL V, 60-74 MIN: ICD-10-PCS | Mod: 95,,, | Performed by: INTERNAL MEDICINE

## 2020-03-30 PROCEDURE — 99499 UNLISTED E&M SERVICE: CPT | Mod: 95,,, | Performed by: INTERNAL MEDICINE

## 2020-03-30 PROCEDURE — 99205 OFFICE O/P NEW HI 60 MIN: CPT | Mod: 95,,, | Performed by: INTERNAL MEDICINE

## 2020-03-30 PROCEDURE — 99499 RISK ADDL DX/OHS AUDIT: ICD-10-PCS | Mod: 95,,, | Performed by: INTERNAL MEDICINE

## 2020-03-30 NOTE — PROGRESS NOTES
NEPHROLOGY CONSULTATION    PHYSICIAN REQUESTING THE CONSULT: Dr. Rah Leon    REASON FOR CONSULTATION: Renal insufficiency    The patient location is: Patient's home  The chief complaint leading to consultation is: Elevated creatinine  Visit type: Virtual visit with synchronous audio and video  Total time spent with patient: 25 miutes  Each patient to whom he or she provides medical services by telemedicine is:  (1) informed of the relationship between the physician and patient and the respective role of any other health care provider with respect to management of the patient; and (2) notified that he or she may decline to receive medical services by telemedicine and may withdraw from such care at any time.    Notes: See below      74 y.o. female with history of HTN, carotid artery disease, GERD, hypothyroidism presents to the renal clinic for evaluation of renal insufficiency. A consultation has been requested by the patient's PMD, Dr. Rah Leon. Patient today presents to the clinic complaining of intermittent chest pain, intermittent abdominal pain, frequent nausea/vomiting, LE swelling. She denies any headaches, congenital hearing loss, SOB, hemoptysis, diarrhea, hematuria, dysuria, rashes, hand/foot paresthesia, nasal congestion. Patient reports denies any NSAID use history.   Patient has a longstanding history of HTN that was diagnosed many years ago.  BP is currently well controlled with home SBP in 110s to 120s. She denies any history of nephrolithiasis, diabetes mellitus. Patient consumes about 2 liters of fluids per day. Patient denies any history of renal disease in her family. Laboratory review revealed that the patient's renal function has been worsening with creatinine increasing from 1.1 on 1/7/2015 to 1.6 on 3/27/20.        Past Medical History:   Diagnosis Date    Heart attack     Hypertension        Past Surgical History:   Procedure Laterality Date    APPENDECTOMY      CAROTID  STENT      CHOLECYSTECTOMY      HYSTERECTOMY      OOPHORECTOMY      SPINE SURGERY      TOTAL REDUCTION MAMMOPLASTY      2014       Review of patient's allergies indicates:   Allergen Reactions    Pcn [penicillins] Hives    Sulfa (sulfonamide antibiotics) Hives       Current Outpatient Medications   Medication Sig Dispense Refill    amitriptyline (ELAVIL) 10 MG tablet Take 10 mg by mouth every evening.      amLODIPine (NORVASC) 10 MG tablet       aspirin (ECOTRIN) 81 MG EC tablet Take 81 mg by mouth once daily.      atorvastatin (LIPITOR) 40 MG tablet Take 40 mg by mouth every evening.      CALCIUM ORAL Take by mouth.      lan seed/ALA/linoleic/oleic (LAN SEED OIL-OMEGA 3-6-9 ORAL) Take by mouth once daily.      cholecalciferol, vitamin D3, (VITAMIN D3) 2,000 unit Cap Take 1 capsule by mouth once daily.      clopidogrel (PLAVIX) 75 mg tablet Take 1 tablet by mouth once daily.      estradiol (ESTRACE) 0.5 MG tablet Take 0.5 mg by mouth once daily.      fluticasone (FLONASE) 50 mcg/actuation nasal spray 1 spray by Each Nare route 2 (two) times daily.      glycopyrrolate (ROBINUL) 1 mg Tab Take 1 mg by mouth.      imipramine (TOFRANIL) 50 MG tablet Take 50 mg by mouth every evening.      isosorbide mononitrate (IMDUR) 30 MG 24 hr tablet       levocetirizine (XYZAL) 5 MG tablet Take 5 mg by mouth every evening.      lisinopril-hydrochlorothiazide (PRINZIDE,ZESTORETIC) 20-12.5 mg per tablet Take 1 tablet by mouth once daily.      metoprolol tartrate (LOPRESSOR) 25 MG tablet Take 25 mg by mouth 2 (two) times daily.      mirtazapine (REMERON) 15 MG tablet Take 15 mg by mouth every evening.      mupirocin (BACTROBAN) 2 % ointment Apply topically 2 (two) times daily.      NIFEDIPINE, BULK, MISC by Misc.(Non-Drug; Combo Route) route 2 (two) times daily.      nitroGLYCERIN (NITROSTAT) 0.4 MG SL tablet Place 0.4 mg under the tongue every 5 (five) minutes as needed for Chest pain.      ondansetron  (ZOFRAN) 4 MG tablet       pantoprazole (PROTONIX) 40 MG tablet Take 40 mg by mouth once daily.      ranitidine (ZANTAC) 150 MG capsule Take 150 mg by mouth 2 (two) times daily.      sodium chloride/sodium bicarb (SINUS RINSE NASL) by Nasal route 2 (two) times daily.      urea 20 % Crea Apply topically to affected area. 85 g 2     No current facility-administered medications for this visit.        No family history on file.    Social History     Socioeconomic History    Marital status:      Spouse name: Not on file    Number of children: Not on file    Years of education: Not on file    Highest education level: Not on file   Occupational History    Not on file   Social Needs    Financial resource strain: Not on file    Food insecurity:     Worry: Not on file     Inability: Not on file    Transportation needs:     Medical: Not on file     Non-medical: Not on file   Tobacco Use    Smoking status: Never Smoker   Substance and Sexual Activity    Alcohol use: No    Drug use: No    Sexual activity: Not Currently   Lifestyle    Physical activity:     Days per week: Not on file     Minutes per session: Not on file    Stress: Not on file   Relationships    Social connections:     Talks on phone: Not on file     Gets together: Not on file     Attends L[employer] service: Not on file     Active member of club or organization: Not on file     Attends meetings of clubs or organizations: Not on file     Relationship status: Not on file   Other Topics Concern    Not on file   Social History Narrative    Not on file       Review of Systems:  1. GENERAL: patient denies any fever, weight changes, generalized weakness, dizziness.  2. HEENT: patient denies headaches, visual disturbances, swallowing problems, sinus pain, nasal congestion.  3. CARDIOVASCULAR: patient reports intermittent chest pain, no palpitations.  4. PULMONARY: patient denies SOB, coughing, hemoptysis, wheezing.  5. GASTROINTESTINAL: patient  reports intermittent abdominal pain and nausea/vomiting, no diarrhea.  6. GENITOURINARY: patient denies dysuria, hematuria, hesitancy, frequency.  7. EXTREMITIES: patient reports intermittent LE edema, no LE cramping.  8. DERMATOLOGY: patient denies rashes, ulcers.  9. NEURO: patient denies tremors, extremity weakness, extremity numbness/tingling.  10. MUSCULOSKELETAL: patient denies joint pain, joint swelling.  11. HEMATOLOGY: patient denies rectal or gum bleeding.  12: PSYCH: patient denies anxiety, depression.      PHYSICAL EXAM:    There were no vitals taken for this visit. g  She reports home SBP in 110s to 120s.    Deferred. Telemedicine Visit.      LABORATORY RESULTS:    Lab Results   Component Value Date    CREATININE 1.6 (H) 03/27/2020    BUN 26 (H) 03/27/2020     03/27/2020    K 4.1 03/27/2020     03/27/2020    CO2 28 03/27/2020      Lab Results   Component Value Date    PTH <5.0 (L) 03/27/2020    CALCIUM 8.1 (L) 03/27/2020    PHOS 4.9 (H) 03/27/2020     Lab Results   Component Value Date    ALBUMIN 3.9 03/27/2020     Lab Results   Component Value Date    WBC 5.25 03/27/2020    HGB 12.5 03/27/2020    HCT 41.0 03/27/2020    MCV 92 03/27/2020     03/27/2020     Urinalysis: no protein, no blood, +2 LE, 13 WBC.      ASSESSMENT AND PLAN:  74 y.o. female with history of HTN, carotid artery disease, GERD, hypothyroidism presents to the renal clinic for evaluation of renal insufficiency.    1. Renal insufficiency: Patient presents with mild renal insufficiency and creatinine has increased from 1.1 on 1/7/2015 to 1.6 on 3/27/20.  Cause of her renal insufficiency is not clear, but intermittent dehydration and intermittent HTN are in the differential. Patient's renal function will be monitored closely and she will return to the clinic in 1 month for follow up. I will order a renal panel, CBC, urinalysis, protein creatinine ratio, PTH prior to her return visit. Patient was advised to hydrate with  60-80 ounces of water per day and to avoid NSAID pain medications such as advil, aleve, motrin, ibuprofen, naprosyn, meloxicam, diclofenac, mobic.      2. Electrolytes: Hypocalcemia. Continue Calcium/vitamin D. Will recheck PTH level.     3. Acid base status: No acute issues.    4. Volume: no major issues.    5. Hypertension: Good BP control with home SBP in 110s to 120s.     6. Medications: Reviewed. Agree with current medical regimen.         Thank you very much for this consult. Please see my note in Epic for recommendations.    Total time spent was about 45 min. 50 % or more of time was spent on counseling patient about treatment options and educating patient about disease etiology. Complex case. Level 5 consult.

## 2020-03-30 NOTE — PATIENT INSTRUCTIONS
Hydrate with 60-80 ounces of water per day.    Avoid NSAID pain medications such as advil, aleve, motrin, ibuprofen, naprosyn, meloxicam, diclofenac, mobic.     Tylenol OK for pain.

## 2020-03-30 NOTE — PROGRESS NOTES
Patient, Alexandrea Butt (MRN #[id number]), presented with a recent Estimated Glumerular Filtration Rate (EGFR) between 30 and 45 consistent with the definition of chronic kidney disease stage 3 - moderate (ICD10 - N18.3).    eGFR if non    Date Value Ref Range Status   03/27/2020 31.7 (A) >60 mL/min/1.73 m^2 Final     Comment:     Calculation used to obtain the estimated glomerular filtration  rate (eGFR) is the CKD-EPI equation.          The patient's chronic kidney disease stage 3 was monitored, evaluated, addressed and/or treated. This addendum to the medical record is made on 03/30/2020.

## 2020-03-30 NOTE — LETTER
March 30, 2020      Rah Leon MD  [address] [facility] [address]           [facility]  [address] [facility] [address]  Phone: [phone]  Fax: [phone]          Patient: Alexandrea Butt   MR Number: [id number]   YOB: 1945   Date of Visit: 3/30/2020       Dear Dr. Rah Leon:    Thank you for referring Alexandrea Butt to me for evaluation. Attached you will find relevant portions of my assessment and plan of care.    If you have questions, please do not hesitate to call me. I look forward to following Alexandrea Butt along with you.    Sincerely,    Donnell Olvera MD    Enclosure  CC:  No Recipients    If you would like to receive this communication electronically, please contact [email or web address] or [phone] to request more information on [facility] Link access.    For providers and/or their staff who would like to refer a patient to [facility], please contact us through our one-stop-shop provider referral line, [facility], at [phone].    If you feel you have received this communication in error or would no longer like to receive these types of communications, please e-mail [email or web address]

## 2020-04-27 ENCOUNTER — LAB VISIT (OUTPATIENT)
Dept: LAB | Facility: HOSPITAL | Age: 75
End: 2020-04-27
Attending: INTERNAL MEDICINE
Payer: MEDICARE

## 2020-04-27 DIAGNOSIS — R79.89 ELEVATED SERUM CREATININE: ICD-10-CM

## 2020-04-27 LAB
ALBUMIN SERPL BCP-MCNC: 3.9 G/DL (ref 3.5–5.2)
ANION GAP SERPL CALC-SCNC: 12 MMOL/L (ref 8–16)
BASOPHILS # BLD AUTO: 0.03 K/UL (ref 0–0.2)
BASOPHILS NFR BLD: 0.4 % (ref 0–1.9)
BUN SERPL-MCNC: 21 MG/DL (ref 8–23)
CALCIUM SERPL-MCNC: 8.2 MG/DL (ref 8.7–10.5)
CHLORIDE SERPL-SCNC: 101 MMOL/L (ref 95–110)
CO2 SERPL-SCNC: 29 MMOL/L (ref 23–29)
CREAT SERPL-MCNC: 1.4 MG/DL (ref 0.5–1.4)
DIFFERENTIAL METHOD: ABNORMAL
EOSINOPHIL # BLD AUTO: 0.2 K/UL (ref 0–0.5)
EOSINOPHIL NFR BLD: 1.9 % (ref 0–8)
ERYTHROCYTE [DISTWIDTH] IN BLOOD BY AUTOMATED COUNT: 13.8 % (ref 11.5–14.5)
EST. GFR  (AFRICAN AMERICAN): 42.7 ML/MIN/1.73 M^2
EST. GFR  (NON AFRICAN AMERICAN): 37 ML/MIN/1.73 M^2
GLUCOSE SERPL-MCNC: 86 MG/DL (ref 70–110)
HCT VFR BLD AUTO: 42.3 % (ref 37–48.5)
HGB BLD-MCNC: 12.8 G/DL (ref 12–16)
IMM GRANULOCYTES # BLD AUTO: 0.02 K/UL (ref 0–0.04)
IMM GRANULOCYTES NFR BLD AUTO: 0.3 % (ref 0–0.5)
LYMPHOCYTES # BLD AUTO: 1.8 K/UL (ref 1–4.8)
LYMPHOCYTES NFR BLD: 23 % (ref 18–48)
MCH RBC QN AUTO: 27.4 PG (ref 27–31)
MCHC RBC AUTO-ENTMCNC: 30.3 G/DL (ref 32–36)
MCV RBC AUTO: 91 FL (ref 82–98)
MONOCYTES # BLD AUTO: 0.5 K/UL (ref 0.3–1)
MONOCYTES NFR BLD: 6.2 % (ref 4–15)
NEUTROPHILS # BLD AUTO: 5.4 K/UL (ref 1.8–7.7)
NEUTROPHILS NFR BLD: 68.2 % (ref 38–73)
NRBC BLD-RTO: 0 /100 WBC
PHOSPHATE SERPL-MCNC: 5.8 MG/DL (ref 2.7–4.5)
PLATELET # BLD AUTO: 273 K/UL (ref 150–350)
PMV BLD AUTO: 10.8 FL (ref 9.2–12.9)
POTASSIUM SERPL-SCNC: 4.1 MMOL/L (ref 3.5–5.1)
PTH-INTACT SERPL-MCNC: <5 PG/ML (ref 9–77)
RBC # BLD AUTO: 4.67 M/UL (ref 4–5.4)
SODIUM SERPL-SCNC: 142 MMOL/L (ref 136–145)
WBC # BLD AUTO: 7.94 K/UL (ref 3.9–12.7)

## 2020-04-27 PROCEDURE — 80069 RENAL FUNCTION PANEL: CPT

## 2020-04-27 PROCEDURE — 36415 COLL VENOUS BLD VENIPUNCTURE: CPT | Mod: PO

## 2020-04-27 PROCEDURE — 83970 ASSAY OF PARATHORMONE: CPT

## 2020-04-27 PROCEDURE — 85025 COMPLETE CBC W/AUTO DIFF WBC: CPT

## 2020-04-29 ENCOUNTER — OFFICE VISIT (OUTPATIENT)
Dept: NEPHROLOGY | Facility: CLINIC | Age: 75
End: 2020-04-29
Payer: MEDICARE

## 2020-04-29 DIAGNOSIS — E83.51 HYPOCALCEMIA: ICD-10-CM

## 2020-04-29 DIAGNOSIS — E83.39 HYPERPHOSPHATEMIA: ICD-10-CM

## 2020-04-29 DIAGNOSIS — N18.30 CKD (CHRONIC KIDNEY DISEASE) STAGE 3, GFR 30-59 ML/MIN: Primary | ICD-10-CM

## 2020-04-29 PROCEDURE — 99214 PR OFFICE/OUTPT VISIT, EST, LEVL IV, 30-39 MIN: ICD-10-PCS | Mod: 95,,, | Performed by: INTERNAL MEDICINE

## 2020-04-29 PROCEDURE — 1159F PR MEDICATION LIST DOCUMENTED IN MEDICAL RECORD: ICD-10-PCS | Mod: 95,,, | Performed by: INTERNAL MEDICINE

## 2020-04-29 PROCEDURE — 99214 OFFICE O/P EST MOD 30 MIN: CPT | Mod: 95,,, | Performed by: INTERNAL MEDICINE

## 2020-04-29 PROCEDURE — 1159F MED LIST DOCD IN RCRD: CPT | Mod: 95,,, | Performed by: INTERNAL MEDICINE

## 2020-04-29 NOTE — PROGRESS NOTES
NEPHROLOGY CONSULTATION    PHYSICIAN REQUESTING THE CONSULT: Dr. Rah Leon    REASON FOR CONSULTATION: Renal insufficiency    The patient location is: Patient's home  The chief complaint leading to consultation is: Elevated creatinine  Visit type: Virtual visit with synchronous audio and video  Total time spent with patient: 25 miutes  Each patient to whom he or she provides medical services by telemedicine is:  (1) informed of the relationship between the physician and patient and the respective role of any other health care provider with respect to management of the patient; and (2) notified that he or she may decline to receive medical services by telemedicine and may withdraw from such care at any time.    Notes: See below    Initial consult note:  75 y.o. female with history of HTN, carotid artery disease, GERD, hypothyroidism, s/p thyroid resection (due to thyroid cancer) presents to the renal clinic for evaluation of renal insufficiency. A consultation has been requested by the patient's PMD, Dr. Rah Leon. Patient today presents to the clinic complaining of intermittent chest pain, intermittent abdominal pain, frequent nausea/vomiting, LE swelling. She denies any headaches, congenital hearing loss, SOB, hemoptysis, diarrhea, hematuria, dysuria, rashes, hand/foot paresthesia, nasal congestion. Patient reports denies any NSAID use history.   Patient has a longstanding history of HTN that was diagnosed many years ago.  BP is currently well controlled with home SBP in 110s to 120s. She denies any history of nephrolithiasis, diabetes mellitus. Patient consumes about 2 liters of fluids per day. Patient denies any history of renal disease in her family. Laboratory review revealed that the patient's renal function has been worsening with creatinine increasing from 1.1 on 1/7/2015 to 1.6 on 3/27/20.    April 29, 2020:  Patient denies any complaints. She reports h/o of thyroid cancer and thyroid  resection in past. Sees Dr. Chris Hill (Endocrinology). Creatinine has declined to 1.4.        Past Medical History:   Diagnosis Date    Heart attack     Hypertension        Past Surgical History:   Procedure Laterality Date    APPENDECTOMY      CAROTID STENT      CHOLECYSTECTOMY      HYSTERECTOMY      OOPHORECTOMY      SPINE SURGERY      TOTAL REDUCTION MAMMOPLASTY      2014       Review of patient's allergies indicates:   Allergen Reactions    Pcn [penicillins] Hives    Sulfa (sulfonamide antibiotics) Hives       Current Outpatient Medications   Medication Sig Dispense Refill    amitriptyline (ELAVIL) 10 MG tablet Take 10 mg by mouth every evening.      amLODIPine (NORVASC) 10 MG tablet       aspirin (ECOTRIN) 81 MG EC tablet Take 81 mg by mouth once daily.      atorvastatin (LIPITOR) 40 MG tablet Take 40 mg by mouth every evening.      CALCIUM ORAL Take by mouth.      lan seed/ALA/linoleic/oleic (LAN SEED OIL-OMEGA 3-6-9 ORAL) Take by mouth once daily.      cholecalciferol, vitamin D3, (VITAMIN D3) 2,000 unit Cap Take 1 capsule by mouth once daily.      clopidogrel (PLAVIX) 75 mg tablet Take 1 tablet by mouth once daily.      estradiol (ESTRACE) 0.5 MG tablet Take 0.5 mg by mouth once daily.      fluticasone (FLONASE) 50 mcg/actuation nasal spray 1 spray by Each Nare route 2 (two) times daily.      glycopyrrolate (ROBINUL) 1 mg Tab Take 1 mg by mouth.      imipramine (TOFRANIL) 50 MG tablet Take 50 mg by mouth every evening.      isosorbide mononitrate (IMDUR) 30 MG 24 hr tablet       levocetirizine (XYZAL) 5 MG tablet Take 5 mg by mouth every evening.      lisinopril-hydrochlorothiazide (PRINZIDE,ZESTORETIC) 20-12.5 mg per tablet Take 1 tablet by mouth once daily.      metoprolol tartrate (LOPRESSOR) 25 MG tablet Take 25 mg by mouth 2 (two) times daily.      mirtazapine (REMERON) 15 MG tablet Take 15 mg by mouth every evening.      mupirocin (BACTROBAN) 2 % ointment Apply  topically 2 (two) times daily.      NIFEDIPINE, BULK, MISC by Misc.(Non-Drug; Combo Route) route 2 (two) times daily.      nitroGLYCERIN (NITROSTAT) 0.4 MG SL tablet Place 0.4 mg under the tongue every 5 (five) minutes as needed for Chest pain.      ondansetron (ZOFRAN) 4 MG tablet       pantoprazole (PROTONIX) 40 MG tablet Take 40 mg by mouth once daily.      ranitidine (ZANTAC) 150 MG capsule Take 150 mg by mouth 2 (two) times daily.      sodium chloride/sodium bicarb (SINUS RINSE NASL) by Nasal route 2 (two) times daily.      urea 20 % Crea Apply topically to affected area. 85 g 2     No current facility-administered medications for this visit.        No family history on file.    Social History     Socioeconomic History    Marital status:      Spouse name: Not on file    Number of children: Not on file    Years of education: Not on file    Highest education level: Not on file   Occupational History    Not on file   Social Needs    Financial resource strain: Not on file    Food insecurity:     Worry: Not on file     Inability: Not on file    Transportation needs:     Medical: Not on file     Non-medical: Not on file   Tobacco Use    Smoking status: Never Smoker   Substance and Sexual Activity    Alcohol use: No    Drug use: No    Sexual activity: Not Currently   Lifestyle    Physical activity:     Days per week: Not on file     Minutes per session: Not on file    Stress: Not on file   Relationships    Social connections:     Talks on phone: Not on file     Gets together: Not on file     Attends [employer]ism service: Not on file     Active member of club or organization: Not on file     Attends meetings of clubs or organizations: Not on file     Relationship status: Not on file   Other Topics Concern    Not on file   Social History Narrative    Not on file       Review of Systems:  1. GENERAL: patient denies any fever, weight changes, generalized weakness, dizziness.  2. HEENT: patient  denies headaches, visual disturbances, swallowing problems, sinus pain, nasal congestion.  3. CARDIOVASCULAR: patient denies chest pain, no palpitations.  4. PULMONARY: patient denies SOB, coughing, hemoptysis, wheezing.  5. GASTROINTESTINAL: patient denies abdominal pain, nausea/vomiting, no diarrhea.  6. GENITOURINARY: patient denies dysuria, hematuria, hesitancy, frequency.  7. EXTREMITIES: patient denies LE edema, no LE cramping.  8. DERMATOLOGY: patient denies rashes, ulcers.  9. NEURO: patient denies tremors, extremity weakness, extremity numbness/tingling.  10. MUSCULOSKELETAL: patient denies joint pain, joint swelling.  11. HEMATOLOGY: patient denies rectal or gum bleeding.  12: PSYCH: patient denies anxiety, depression.      PHYSICAL EXAM:    She reports home SBP in 120s.    Deferred. Telemedicine Visit.      LABORATORY RESULTS:    Lab Results   Component Value Date    CREATININE 1.4 04/27/2020    BUN 21 04/27/2020     04/27/2020    K 4.1 04/27/2020     04/27/2020    CO2 29 04/27/2020      Lab Results   Component Value Date    PTH <5.0 (L) 04/27/2020    CALCIUM 8.2 (L) 04/27/2020    PHOS 5.8 (H) 04/27/2020     Lab Results   Component Value Date    ALBUMIN 3.9 04/27/2020     Lab Results   Component Value Date    WBC 7.94 04/27/2020    HGB 12.8 04/27/2020    HCT 42.3 04/27/2020    MCV 91 04/27/2020     04/27/2020     Urinalysis: no protein, no blood (4/27/20)      ASSESSMENT AND PLAN:  75 y.o. female with history of HTN, carotid artery disease, GERD, hypothyroidism, h/o thyroid cancer, s/p thyroidectomy  presents to the renal clinic for evaluation of renal insufficiency.    1. Renal insufficiency: Patient presents with mild renal insufficiency and creatinine has decreased from 1.6 to 1.4 (CKD 3).   Cause of her renal insufficiency is not clear, but intermittent dehydration and intermittent HTN are in the differential. Patient's renal function will be monitored closely and she will return to  the clinic in 4 months for follow up.  Patient was advised to hydrate with 60-80 ounces of water per day and to avoid NSAID pain medications such as advil, aleve, motrin, ibuprofen, naprosyn, meloxicam, diclofenac, mobic.      2. Electrolytes:   2.1. Hypocalcemia. Due to hypoparathyroidism. Patient reports thyroidectomy for thyroid cancer and hypoparathyroidism is likely a consequence of thyroid resection. Patient is on calcium/vitamin D supplements and sees Dr. Hill (Endocrinology).  2.2. Hyperphosphatemia: likely a result of hypoparathyroidism. Patient was advised to start low phos diet and to avoid nuts, cereals, dairy products, meats and sausages.     3. Acid base status: No acute issues.    4. Volume: no major issues.    5. Hypertension: Good BP control with home SBP in 120s.     6. Medications: Reviewed. Agree with current medical regimen.

## 2020-08-28 ENCOUNTER — LAB VISIT (OUTPATIENT)
Dept: LAB | Facility: HOSPITAL | Age: 75
End: 2020-08-28
Attending: INTERNAL MEDICINE
Payer: MEDICARE

## 2020-08-28 DIAGNOSIS — E83.39 HYPERPHOSPHATEMIA: ICD-10-CM

## 2020-08-28 DIAGNOSIS — N18.30 CKD (CHRONIC KIDNEY DISEASE) STAGE 3, GFR 30-59 ML/MIN: ICD-10-CM

## 2020-08-28 DIAGNOSIS — E83.51 HYPOCALCEMIA: ICD-10-CM

## 2020-08-28 LAB
ALBUMIN SERPL BCP-MCNC: 4.2 G/DL (ref 3.5–5.2)
ANION GAP SERPL CALC-SCNC: 10 MMOL/L (ref 8–16)
BUN SERPL-MCNC: 30 MG/DL (ref 8–23)
CALCIUM SERPL-MCNC: 8.5 MG/DL (ref 8.7–10.5)
CHLORIDE SERPL-SCNC: 105 MMOL/L (ref 95–110)
CO2 SERPL-SCNC: 26 MMOL/L (ref 23–29)
CREAT SERPL-MCNC: 1.6 MG/DL (ref 0.5–1.4)
EST. GFR  (AFRICAN AMERICAN): 36.3 ML/MIN/1.73 M^2
EST. GFR  (NON AFRICAN AMERICAN): 31.5 ML/MIN/1.73 M^2
GLUCOSE SERPL-MCNC: 99 MG/DL (ref 70–110)
PHOSPHATE SERPL-MCNC: 4.7 MG/DL (ref 2.7–4.5)
POTASSIUM SERPL-SCNC: 4 MMOL/L (ref 3.5–5.1)
SODIUM SERPL-SCNC: 141 MMOL/L (ref 136–145)

## 2020-08-28 PROCEDURE — 80069 RENAL FUNCTION PANEL: CPT

## 2020-08-28 PROCEDURE — 36415 COLL VENOUS BLD VENIPUNCTURE: CPT | Mod: PO

## 2020-09-02 ENCOUNTER — OFFICE VISIT (OUTPATIENT)
Dept: DERMATOLOGY | Facility: CLINIC | Age: 75
End: 2020-09-02
Payer: MEDICARE

## 2020-09-02 DIAGNOSIS — T14.8XXA EXCORIATION: ICD-10-CM

## 2020-09-02 DIAGNOSIS — L81.9 DYSCHROMIA: ICD-10-CM

## 2020-09-02 DIAGNOSIS — L81.4 LENTIGINES: Primary | ICD-10-CM

## 2020-09-02 PROCEDURE — 99999 PR PBB SHADOW E&M-EST. PATIENT-LVL III: CPT | Mod: PBBFAC,,, | Performed by: PHYSICIAN ASSISTANT

## 2020-09-02 PROCEDURE — 99999 PR PBB SHADOW E&M-EST. PATIENT-LVL III: ICD-10-PCS | Mod: PBBFAC,,, | Performed by: PHYSICIAN ASSISTANT

## 2020-09-02 PROCEDURE — 1126F AMNT PAIN NOTED NONE PRSNT: CPT | Mod: S$GLB,,, | Performed by: PHYSICIAN ASSISTANT

## 2020-09-02 PROCEDURE — 1159F PR MEDICATION LIST DOCUMENTED IN MEDICAL RECORD: ICD-10-PCS | Mod: S$GLB,,, | Performed by: PHYSICIAN ASSISTANT

## 2020-09-02 PROCEDURE — 99213 OFFICE O/P EST LOW 20 MIN: CPT | Mod: S$GLB,,, | Performed by: PHYSICIAN ASSISTANT

## 2020-09-02 PROCEDURE — 1159F MED LIST DOCD IN RCRD: CPT | Mod: S$GLB,,, | Performed by: PHYSICIAN ASSISTANT

## 2020-09-02 PROCEDURE — 1126F PR PAIN SEVERITY QUANTIFIED, NO PAIN PRESENT: ICD-10-PCS | Mod: S$GLB,,, | Performed by: PHYSICIAN ASSISTANT

## 2020-09-02 PROCEDURE — 99213 PR OFFICE/OUTPT VISIT, EST, LEVL III, 20-29 MIN: ICD-10-PCS | Mod: S$GLB,,, | Performed by: PHYSICIAN ASSISTANT

## 2020-09-02 NOTE — PROGRESS NOTES
Subjective:       Patient ID:  Alexandrea Butt is a 75 y.o. female who presents for   Chief Complaint   Patient presents with    Skin Discoloration     face     Hx of xerosis cutis, last seen by Dr. Jackson October 2018. Here for new c/o today.   History of Present Illness: The patient presents with chief complaint of dark spots.  Location: face  Duration: first noticed about 1 day ago  Signs/Symptoms: several scattered dark, flat spots    Prior treatments: none    PMHX: denies skin CA  FHX: denies skin CA        Review of Systems   Constitutional: Negative for fever and chills.   Gastrointestinal: Negative for nausea and vomiting.   Skin: Positive for activity-related sunscreen use. Negative for itching, rash, dry skin, sun sensitivity, daily sunscreen use and recent sunburn.   Hematologic/Lymphatic: Does not bruise/bleed easily.        Objective:    Physical Exam   Constitutional: She appears well-developed and well-nourished. No distress.   Neurological: She is alert and oriented to person, place, and time. She is not disoriented.   Psychiatric: She has a normal mood and affect.   Skin:   Areas Examined (abnormalities noted in diagram):   Head / Face Inspection Performed  Neck Inspection Performed  Chest / Axilla Inspection Performed  Back Inspection Performed  RUE Inspected  LUE Inspection Performed                   Diagram Legend     Erythematous scaling macule/papule c/w actinic keratosis       Vascular papule c/w angioma      Pigmented verrucoid papule/plaque c/w seborrheic keratosis      Yellow umbilicated papule c/w sebaceous hyperplasia      Irregularly shaped tan macule c/w lentigo     1-2 mm smooth white papules consistent with Milia      Movable subcutaneous cyst with punctum c/w epidermal inclusion cyst      Subcutaneous movable cyst c/w pilar cyst      Firm pink to brown papule c/w dermatofibroma      Pedunculated fleshy papule(s) c/w skin tag(s)      Evenly pigmented macule c/w junctional nevus      Mildly variegated pigmented, slightly irregular-bordered macule c/w mildly atypical nevus      Flesh colored to evenly pigmented papule c/w intradermal nevus       Pink pearly papule/plaque c/w basal cell carcinoma      Erythematous hyperkeratotic cursted plaque c/w SCC      Surgical scar with no sign of skin cancer recurrence      Open and closed comedones      Inflammatory papules and pustules      Verrucoid papule consistent consistent with wart     Erythematous eczematous patches and plaques     Dystrophic onycholytic nail with subungual debris c/w onychomycosis     Umbilicated papule    Erythematous-base heme-crusted tan verrucoid plaque consistent with inflamed seborrheic keratosis     Erythematous Silvery Scaling Plaque c/w Psoriasis     See annotation      Assessment / Plan:        Lentigines  Discussed dx and tx options. Start daily spf 30 w/broad spectrum coverage. Light cryo applied to two lesions of right cheek after pt gave verbal consent, she tolerated well and understands it may blister and scab. Would reconsider trial of retinoid in future.    Excoriation  Dyschromia  Avoid picking, gentle skin care recommended. May use vaseline prn.         Follow up if symptoms worsen or fail to improve.

## 2020-09-04 ENCOUNTER — OFFICE VISIT (OUTPATIENT)
Dept: NEPHROLOGY | Facility: CLINIC | Age: 75
End: 2020-09-04
Payer: MEDICARE

## 2020-09-04 VITALS
WEIGHT: 213.19 LBS | RESPIRATION RATE: 20 BRPM | HEART RATE: 60 BPM | SYSTOLIC BLOOD PRESSURE: 140 MMHG | DIASTOLIC BLOOD PRESSURE: 70 MMHG | BODY MASS INDEX: 37.77 KG/M2 | HEIGHT: 63 IN

## 2020-09-04 DIAGNOSIS — N18.30 CKD (CHRONIC KIDNEY DISEASE) STAGE 3, GFR 30-59 ML/MIN: Primary | ICD-10-CM

## 2020-09-04 PROCEDURE — 1126F AMNT PAIN NOTED NONE PRSNT: CPT | Mod: S$GLB,,, | Performed by: INTERNAL MEDICINE

## 2020-09-04 PROCEDURE — 3008F PR BODY MASS INDEX (BMI) DOCUMENTED: ICD-10-PCS | Mod: CPTII,S$GLB,, | Performed by: INTERNAL MEDICINE

## 2020-09-04 PROCEDURE — 3008F BODY MASS INDEX DOCD: CPT | Mod: CPTII,S$GLB,, | Performed by: INTERNAL MEDICINE

## 2020-09-04 PROCEDURE — 1159F MED LIST DOCD IN RCRD: CPT | Mod: S$GLB,,, | Performed by: INTERNAL MEDICINE

## 2020-09-04 PROCEDURE — 1101F PT FALLS ASSESS-DOCD LE1/YR: CPT | Mod: CPTII,S$GLB,, | Performed by: INTERNAL MEDICINE

## 2020-09-04 PROCEDURE — 99999 PR PBB SHADOW E&M-EST. PATIENT-LVL V: CPT | Mod: PBBFAC,,, | Performed by: INTERNAL MEDICINE

## 2020-09-04 PROCEDURE — 99999 PR PBB SHADOW E&M-EST. PATIENT-LVL V: ICD-10-PCS | Mod: PBBFAC,,, | Performed by: INTERNAL MEDICINE

## 2020-09-04 PROCEDURE — 1101F PR PT FALLS ASSESS DOC 0-1 FALLS W/OUT INJ PAST YR: ICD-10-PCS | Mod: CPTII,S$GLB,, | Performed by: INTERNAL MEDICINE

## 2020-09-04 PROCEDURE — 1159F PR MEDICATION LIST DOCUMENTED IN MEDICAL RECORD: ICD-10-PCS | Mod: S$GLB,,, | Performed by: INTERNAL MEDICINE

## 2020-09-04 PROCEDURE — 1126F PR PAIN SEVERITY QUANTIFIED, NO PAIN PRESENT: ICD-10-PCS | Mod: S$GLB,,, | Performed by: INTERNAL MEDICINE

## 2020-09-04 PROCEDURE — 99214 OFFICE O/P EST MOD 30 MIN: CPT | Mod: S$GLB,,, | Performed by: INTERNAL MEDICINE

## 2020-09-04 PROCEDURE — 99214 PR OFFICE/OUTPT VISIT, EST, LEVL IV, 30-39 MIN: ICD-10-PCS | Mod: S$GLB,,, | Performed by: INTERNAL MEDICINE

## 2020-09-04 RX ORDER — CALCITRIOL 0.25 UG/1
0.25 CAPSULE ORAL 2 TIMES DAILY
COMMUNITY

## 2020-09-04 RX ORDER — OMEPRAZOLE 40 MG/1
40 CAPSULE, DELAYED RELEASE ORAL
COMMUNITY
End: 2021-04-14

## 2020-09-04 RX ORDER — LEVOTHYROXINE SODIUM 100 UG/1
100 TABLET ORAL
COMMUNITY

## 2020-09-04 RX ORDER — METOPROLOL SUCCINATE 50 MG/1
50 TABLET, EXTENDED RELEASE ORAL DAILY
COMMUNITY

## 2020-09-04 NOTE — PROGRESS NOTES
"NEPHROLOGY CONSULTATION    PHYSICIAN REQUESTING THE CONSULT: Dr. Rah Leon    REASON FOR CONSULTATION: Renal insufficiency      Initial consult note:  75 y.o. female with history of HTN, carotid artery disease, GERD, hypothyroidism, s/p thyroid resection (due to thyroid cancer) presents to the renal clinic for evaluation of renal insufficiency. A consultation has been requested by the patient's PMD, Dr. Rah Leon. Patient today presents to the clinic complaining of intermittent chest pain, intermittent abdominal pain, frequent nausea/vomiting, LE swelling. She denies any headaches, congenital hearing loss, SOB, hemoptysis, diarrhea, hematuria, dysuria, rashes, hand/foot paresthesia, nasal congestion. Patient reports denies any NSAID use history.   Patient has a longstanding history of HTN that was diagnosed many years ago.  BP is currently well controlled with home SBP in 110s to 120s. She denies any history of nephrolithiasis, diabetes mellitus. Patient consumes about 2 liters of fluids per day. Patient denies any history of renal disease in her family. Laboratory review revealed that the patient's renal function has been worsening with creatinine increasing from 1.1 on 1/7/2015 to 1.6 on 3/27/20.    April 29, 2020:  Patient denies any complaints. She reports h/o of thyroid cancer and thyroid resection in past. Sees Dr. Chris Hill (Endocrinology). Creatinine has declined to 1.4.    September 4, 2020"  Patient without complaints. Creatinine at 1.6.         Past Medical History:   Diagnosis Date    Heart attack     Hypertension        Past Surgical History:   Procedure Laterality Date    APPENDECTOMY      CAROTID STENT      CHOLECYSTECTOMY      HYSTERECTOMY      OOPHORECTOMY      SPINE SURGERY      TOTAL REDUCTION MAMMOPLASTY      2014       Review of patient's allergies indicates:   Allergen Reactions    Pcn [penicillins] Hives    Sulfa (sulfonamide antibiotics) Hives       Current " Outpatient Medications   Medication Sig Dispense Refill    amitriptyline (ELAVIL) 10 MG tablet Take 10 mg by mouth every evening.      amLODIPine (NORVASC) 10 MG tablet       aspirin (ECOTRIN) 81 MG EC tablet Take 81 mg by mouth once daily.      atorvastatin (LIPITOR) 40 MG tablet Take 40 mg by mouth every evening.      CALCIUM ORAL Take by mouth.      lan seed/ALA/linoleic/oleic (LAN SEED OIL-OMEGA 3-6-9 ORAL) Take by mouth once daily.      cholecalciferol, vitamin D3, (VITAMIN D3) 2,000 unit Cap Take 1 capsule by mouth once daily.      clopidogrel (PLAVIX) 75 mg tablet Take 1 tablet by mouth once daily.      estradiol (ESTRACE) 0.5 MG tablet Take 0.5 mg by mouth once daily.      fluticasone (FLONASE) 50 mcg/actuation nasal spray 1 spray by Each Nare route 2 (two) times daily.      glycopyrrolate (ROBINUL) 1 mg Tab Take 1 mg by mouth.      imipramine (TOFRANIL) 50 MG tablet Take 50 mg by mouth every evening.      isosorbide mononitrate (IMDUR) 30 MG 24 hr tablet       levocetirizine (XYZAL) 5 MG tablet Take 5 mg by mouth every evening.      lisinopril-hydrochlorothiazide (PRINZIDE,ZESTORETIC) 20-12.5 mg per tablet Take 1 tablet by mouth once daily.      metoprolol tartrate (LOPRESSOR) 25 MG tablet Take 25 mg by mouth 2 (two) times daily.      mirtazapine (REMERON) 15 MG tablet Take 15 mg by mouth every evening.      mupirocin (BACTROBAN) 2 % ointment Apply topically 2 (two) times daily.      NIFEDIPINE, BULK, MISC by Misc.(Non-Drug; Combo Route) route 2 (two) times daily.      nitroGLYCERIN (NITROSTAT) 0.4 MG SL tablet Place 0.4 mg under the tongue every 5 (five) minutes as needed for Chest pain.      ondansetron (ZOFRAN) 4 MG tablet       pantoprazole (PROTONIX) 40 MG tablet Take 40 mg by mouth once daily.      ranitidine (ZANTAC) 150 MG capsule Take 150 mg by mouth 2 (two) times daily.      sodium chloride/sodium bicarb (SINUS RINSE NASL) by Nasal route 2 (two) times daily.      urea 20  % Crea Apply topically to affected area. 85 g 2     No current facility-administered medications for this visit.        No family history on file.    Social History     Socioeconomic History    Marital status:      Spouse name: Not on file    Number of children: Not on file    Years of education: Not on file    Highest education level: Not on file   Occupational History    Not on file   Social Needs    Financial resource strain: Not on file    Food insecurity     Worry: Not on file     Inability: Not on file    Transportation needs     Medical: Not on file     Non-medical: Not on file   Tobacco Use    Smoking status: Never Smoker   Substance and Sexual Activity    Alcohol use: No    Drug use: No    Sexual activity: Not Currently   Lifestyle    Physical activity     Days per week: Not on file     Minutes per session: Not on file    Stress: Not on file   Relationships    Social connections     Talks on phone: Not on file     Gets together: Not on file     Attends [employer] service: Not on file     Active member of club or organization: Not on file     Attends meetings of clubs or organizations: Not on file     Relationship status: Not on file   Other Topics Concern    Not on file   Social History Narrative    Not on file       Review of Systems:  1. GENERAL: patient denies any fever, weight changes, generalized weakness, dizziness.  2. HEENT: patient denies headaches, visual disturbances, swallowing problems, sinus pain, nasal congestion.  3. CARDIOVASCULAR: patient denies chest pain, no palpitations.  4. PULMONARY: patient denies SOB, coughing, hemoptysis, wheezing.  5. GASTROINTESTINAL: patient denies abdominal pain, nausea/vomiting, no diarrhea.  6. GENITOURINARY: patient denies dysuria, hematuria, hesitancy, frequency.  7. EXTREMITIES: patient denies LE edema, no LE cramping.  8. DERMATOLOGY: patient denies rashes, ulcers.  9. NEURO: patient denies tremors, extremity weakness, extremity  "numbness/tingling.  10. MUSCULOSKELETAL: patient denies joint pain, joint swelling.  11. HEMATOLOGY: patient denies rectal or gum bleeding.  12: PSYCH: patient denies anxiety, depression.      PHYSICAL EXAM:    BP (!) 140/70   Pulse 60   Resp 20   Ht 5' 3" (1.6 m)   Wt 96.7 kg (213 lb 3 oz)   BMI 37.76 kg/m²     GENERAL: Pleasant lady presents to clinic with non-labored breathing.  HEENT: PER, no nasal discharge, no icterus, no oral exudates, moist mucosal membranes.  NECK: no thyroid mass, no lymphadenopathy.  HEART: RRR S1/S2, no rubs, good peripheral pulses.  LUNGS: CTA bilaterally, no wheezing, breathing is nonlabored.  ABDOMEN: soft, nontender, not distended, bowel sounds are present, no abdominal hernia.  EXTREM: no LE edema.  SKIN: no rashes, skin is warm and dry.  NEURO: A & O x 3, no obvious focal signs.      LABORATORY RESULTS:    Lab Results   Component Value Date    CREATININE 1.6 (H) 08/28/2020    BUN 30 (H) 08/28/2020     08/28/2020    K 4.0 08/28/2020     08/28/2020    CO2 26 08/28/2020      Lab Results   Component Value Date    PTH <5.0 (L) 04/27/2020    CALCIUM 8.5 (L) 08/28/2020    PHOS 4.7 (H) 08/28/2020     Lab Results   Component Value Date    ALBUMIN 4.2 08/28/2020     Lab Results   Component Value Date    WBC 7.94 04/27/2020    HGB 12.8 04/27/2020    HCT 42.3 04/27/2020    MCV 91 04/27/2020     04/27/2020     Urinalysis: no protein, no blood (8/28/20)      ASSESSMENT AND PLAN:  75 y.o. female with history of HTN, carotid artery disease, GERD, hypothyroidism, h/o thyroid cancer, s/p thyroidectomy  presents to the renal clinic for evaluation of renal insufficiency.    1. Renal insufficiency: Patient presents with mild renal insufficiency and creatinine has stabilized at 1.6 (CKD 3).   Cause of her renal insufficiency is not clear, but intermittent dehydration and intermittent HTN are in the differential. Patient's renal function will be monitored closely and she will " return to the clinic in 5 months for follow up.  Patient was advised to hydrate with 60-80 ounces of water per day and to avoid NSAID pain medications such as advil, aleve, motrin, ibuprofen, naprosyn, meloxicam, diclofenac, mobic.      2. Electrolytes:   2.1. Hypocalcemia. Due to hypoparathyroidism. Patient reports thyroidectomy for thyroid cancer and hypoparathyroidism is likely a consequence of thyroid resection. Patient is on calcium/vitamin D supplements and sees Dr. Hill (Endocrinology).  2.2. Hyperphosphatemia: has resolved.     3. Acid base status: No acute issues.    4. Volume: no major issues.    5. Hypertension: acceptable BP control.     6. Medications: Reviewed. Agree with current medical regimen.

## 2020-12-09 ENCOUNTER — TELEPHONE (OUTPATIENT)
Dept: FAMILY MEDICINE | Facility: CLINIC | Age: 75
End: 2020-12-09

## 2020-12-09 NOTE — TELEPHONE ENCOUNTER
----- Message from Antonella Zepeda sent at 12/9/2020  2:09 PM CST -----  Contact: self/[phone]  Patient would like a order for a Mammo, please call back at [phone]. Thanks/ar

## 2020-12-17 ENCOUNTER — PATIENT MESSAGE (OUTPATIENT)
Dept: NEPHROLOGY | Facility: CLINIC | Age: 75
End: 2020-12-17

## 2021-01-12 ENCOUNTER — HOSPITAL ENCOUNTER (OUTPATIENT)
Dept: RADIOLOGY | Facility: HOSPITAL | Age: 76
Discharge: HOME | End: 2021-01-12
Attending: NURSE PRACTITIONER
Payer: MEDICARE

## 2021-01-12 VITALS — WEIGHT: 213.19 LBS | HEIGHT: 63 IN | BODY MASS INDEX: 37.77 KG/M2

## 2021-01-12 DIAGNOSIS — Z12.39 SCREENING FOR MALIGNANT NEOPLASM OF BREAST: ICD-10-CM

## 2021-01-12 PROCEDURE — 77063 MAMMO DIGITAL SCREENING BILAT WITH TOMO: ICD-10-PCS | Mod: 26,,, | Performed by: RADIOLOGY

## 2021-01-12 PROCEDURE — 77067 SCR MAMMO BI INCL CAD: CPT | Mod: 26,,, | Performed by: RADIOLOGY

## 2021-01-12 PROCEDURE — 77063 BREAST TOMOSYNTHESIS BI: CPT | Mod: 26,,, | Performed by: RADIOLOGY

## 2021-01-12 PROCEDURE — 77067 MAMMO DIGITAL SCREENING BILAT WITH TOMO: ICD-10-PCS | Mod: 26,,, | Performed by: RADIOLOGY

## 2021-01-12 PROCEDURE — 77067 SCR MAMMO BI INCL CAD: CPT | Mod: TC

## 2021-04-06 ENCOUNTER — LAB VISIT (OUTPATIENT)
Dept: LAB | Facility: HOSPITAL | Age: 76
End: 2021-04-06
Attending: INTERNAL MEDICINE
Payer: MEDICARE

## 2021-04-06 DIAGNOSIS — N18.30 CKD (CHRONIC KIDNEY DISEASE) STAGE 3, GFR 30-59 ML/MIN: ICD-10-CM

## 2021-04-06 LAB
ALBUMIN SERPL BCP-MCNC: 3.9 G/DL (ref 3.5–5.2)
ANION GAP SERPL CALC-SCNC: 12 MMOL/L (ref 8–16)
BASOPHILS # BLD AUTO: 0.02 K/UL (ref 0–0.2)
BASOPHILS NFR BLD: 0.3 % (ref 0–1.9)
BUN SERPL-MCNC: 21 MG/DL (ref 8–23)
CALCIUM SERPL-MCNC: 8.1 MG/DL (ref 8.7–10.5)
CHLORIDE SERPL-SCNC: 104 MMOL/L (ref 95–110)
CO2 SERPL-SCNC: 27 MMOL/L (ref 23–29)
CREAT SERPL-MCNC: 1.4 MG/DL (ref 0.5–1.4)
DIFFERENTIAL METHOD: NORMAL
EOSINOPHIL # BLD AUTO: 0.1 K/UL (ref 0–0.5)
EOSINOPHIL NFR BLD: 2 % (ref 0–8)
ERYTHROCYTE [DISTWIDTH] IN BLOOD BY AUTOMATED COUNT: 13.9 % (ref 11.5–14.5)
EST. GFR  (AFRICAN AMERICAN): 43 ML/MIN/1.73 M^2
EST. GFR  (NON AFRICAN AMERICAN): 37 ML/MIN/1.73 M^2
GLUCOSE SERPL-MCNC: 109 MG/DL (ref 70–110)
HCT VFR BLD AUTO: 41.4 % (ref 37–48.5)
HGB BLD-MCNC: 13.5 G/DL (ref 12–16)
IMM GRANULOCYTES # BLD AUTO: 0.01 K/UL (ref 0–0.04)
IMM GRANULOCYTES NFR BLD AUTO: 0.2 % (ref 0–0.5)
LYMPHOCYTES # BLD AUTO: 1.6 K/UL (ref 1–4.8)
LYMPHOCYTES NFR BLD: 23.5 % (ref 18–48)
MCH RBC QN AUTO: 28 PG (ref 27–31)
MCHC RBC AUTO-ENTMCNC: 32.6 G/DL (ref 32–36)
MCV RBC AUTO: 86 FL (ref 82–98)
MONOCYTES # BLD AUTO: 0.4 K/UL (ref 0.3–1)
MONOCYTES NFR BLD: 5.3 % (ref 4–15)
NEUTROPHILS # BLD AUTO: 4.6 K/UL (ref 1.8–7.7)
NEUTROPHILS NFR BLD: 68.7 % (ref 38–73)
NRBC BLD-RTO: 0 /100 WBC
PHOSPHATE SERPL-MCNC: 5.3 MG/DL (ref 2.7–4.5)
PLATELET # BLD AUTO: 216 K/UL (ref 150–450)
PMV BLD AUTO: 9.6 FL (ref 9.2–12.9)
POTASSIUM SERPL-SCNC: 3.9 MMOL/L (ref 3.5–5.1)
RBC # BLD AUTO: 4.82 M/UL (ref 4–5.4)
SODIUM SERPL-SCNC: 143 MMOL/L (ref 136–145)
WBC # BLD AUTO: 6.64 K/UL (ref 3.9–12.7)

## 2021-04-06 PROCEDURE — 36415 COLL VENOUS BLD VENIPUNCTURE: CPT | Performed by: INTERNAL MEDICINE

## 2021-04-06 PROCEDURE — 80069 RENAL FUNCTION PANEL: CPT | Performed by: INTERNAL MEDICINE

## 2021-04-06 PROCEDURE — 85025 COMPLETE CBC W/AUTO DIFF WBC: CPT | Performed by: INTERNAL MEDICINE

## 2021-04-14 ENCOUNTER — TELEPHONE (OUTPATIENT)
Dept: NEPHROLOGY | Facility: CLINIC | Age: 76
End: 2021-04-14

## 2021-04-14 ENCOUNTER — PATIENT MESSAGE (OUTPATIENT)
Dept: NEPHROLOGY | Facility: CLINIC | Age: 76
End: 2021-04-14

## 2021-04-14 ENCOUNTER — OFFICE VISIT (OUTPATIENT)
Dept: NEPHROLOGY | Facility: CLINIC | Age: 76
End: 2021-04-14
Payer: MEDICARE

## 2021-04-14 DIAGNOSIS — I10 HYPERTENSION, UNSPECIFIED TYPE: ICD-10-CM

## 2021-04-14 DIAGNOSIS — I12.9 HYPERTENSIVE CHRONIC KIDNEY DISEASE WITH STAGE 1 THROUGH STAGE 4 CHRONIC KIDNEY DISEASE, OR UNSPECIFIED CHRONIC KIDNEY DISEASE: ICD-10-CM

## 2021-04-14 DIAGNOSIS — E83.51 HYPOCALCEMIA: ICD-10-CM

## 2021-04-14 DIAGNOSIS — E83.39 HYPERPHOSPHATEMIA: ICD-10-CM

## 2021-04-14 DIAGNOSIS — E20.9 HYPOPARATHYROIDISM, UNSPECIFIED HYPOPARATHYROIDISM TYPE: ICD-10-CM

## 2021-04-14 DIAGNOSIS — N18.30 STAGE 3 CHRONIC KIDNEY DISEASE, UNSPECIFIED WHETHER STAGE 3A OR 3B CKD: Primary | ICD-10-CM

## 2021-04-14 PROCEDURE — 1159F PR MEDICATION LIST DOCUMENTED IN MEDICAL RECORD: ICD-10-PCS | Mod: 95,,, | Performed by: NURSE PRACTITIONER

## 2021-04-14 PROCEDURE — 99214 OFFICE O/P EST MOD 30 MIN: CPT | Mod: 95,,, | Performed by: NURSE PRACTITIONER

## 2021-04-14 PROCEDURE — 99214 PR OFFICE/OUTPT VISIT, EST, LEVL IV, 30-39 MIN: ICD-10-PCS | Mod: 95,,, | Performed by: NURSE PRACTITIONER

## 2021-04-14 PROCEDURE — 1159F MED LIST DOCD IN RCRD: CPT | Mod: 95,,, | Performed by: NURSE PRACTITIONER

## 2021-04-15 ENCOUNTER — TELEPHONE (OUTPATIENT)
Dept: RADIOLOGY | Facility: HOSPITAL | Age: 76
End: 2021-04-15

## 2021-04-16 ENCOUNTER — HOSPITAL ENCOUNTER (OUTPATIENT)
Dept: RADIOLOGY | Facility: HOSPITAL | Age: 76
Discharge: HOME | End: 2021-04-16
Attending: NURSE PRACTITIONER
Payer: MEDICARE

## 2021-04-16 DIAGNOSIS — N18.30 STAGE 3 CHRONIC KIDNEY DISEASE, UNSPECIFIED WHETHER STAGE 3A OR 3B CKD: ICD-10-CM

## 2021-04-16 PROCEDURE — 76770 US RETROPERITONEAL COMPLETE: ICD-10-PCS | Mod: 26,,, | Performed by: RADIOLOGY

## 2021-04-16 PROCEDURE — 76770 US EXAM ABDO BACK WALL COMP: CPT | Mod: TC

## 2021-04-16 PROCEDURE — 76770 US EXAM ABDO BACK WALL COMP: CPT | Mod: 26,,, | Performed by: RADIOLOGY

## 2021-04-19 ENCOUNTER — PATIENT MESSAGE (OUTPATIENT)
Dept: NEPHROLOGY | Facility: CLINIC | Age: 76
End: 2021-04-19

## 2021-04-20 ENCOUNTER — PATIENT MESSAGE (OUTPATIENT)
Dept: NEPHROLOGY | Facility: CLINIC | Age: 76
End: 2021-04-20

## 2021-04-21 ENCOUNTER — LAB VISIT (OUTPATIENT)
Dept: LAB | Facility: HOSPITAL | Age: 76
End: 2021-04-21
Attending: NURSE PRACTITIONER
Payer: MEDICARE

## 2021-04-21 DIAGNOSIS — N18.30 STAGE 3 CHRONIC KIDNEY DISEASE, UNSPECIFIED WHETHER STAGE 3A OR 3B CKD: ICD-10-CM

## 2021-04-21 DIAGNOSIS — E83.39 HYPERPHOSPHATEMIA: ICD-10-CM

## 2021-04-21 DIAGNOSIS — E20.9 HYPOPARATHYROIDISM, UNSPECIFIED HYPOPARATHYROIDISM TYPE: ICD-10-CM

## 2021-04-21 DIAGNOSIS — E83.51 HYPOCALCEMIA: ICD-10-CM

## 2021-04-21 PROCEDURE — 83970 ASSAY OF PARATHORMONE: CPT | Performed by: NURSE PRACTITIONER

## 2021-04-21 PROCEDURE — 36415 COLL VENOUS BLD VENIPUNCTURE: CPT | Mod: PO | Performed by: NURSE PRACTITIONER

## 2021-04-22 ENCOUNTER — PATIENT MESSAGE (OUTPATIENT)
Dept: NEPHROLOGY | Facility: CLINIC | Age: 76
End: 2021-04-22

## 2021-04-22 ENCOUNTER — TELEPHONE (OUTPATIENT)
Dept: NEPHROLOGY | Facility: CLINIC | Age: 76
End: 2021-04-22

## 2021-04-22 DIAGNOSIS — R82.994 HYPERCALCIURIA: Primary | ICD-10-CM

## 2021-04-22 LAB — PTH-INTACT SERPL-MCNC: <5 PG/ML (ref 9–77)

## 2021-04-23 ENCOUNTER — PATIENT MESSAGE (OUTPATIENT)
Dept: NEPHROLOGY | Facility: CLINIC | Age: 76
End: 2021-04-23

## 2021-04-27 ENCOUNTER — LAB VISIT (OUTPATIENT)
Dept: LAB | Facility: HOSPITAL | Age: 76
End: 2021-04-27
Attending: NURSE PRACTITIONER
Payer: MEDICARE

## 2021-04-27 DIAGNOSIS — R82.994 HYPERCALCIURIA: ICD-10-CM

## 2021-04-27 PROCEDURE — 82570 ASSAY OF URINE CREATININE: CPT | Performed by: NURSE PRACTITIONER

## 2021-04-27 PROCEDURE — 82340 ASSAY OF CALCIUM IN URINE: CPT | Performed by: NURSE PRACTITIONER

## 2021-04-28 ENCOUNTER — PATIENT MESSAGE (OUTPATIENT)
Dept: NEPHROLOGY | Facility: CLINIC | Age: 76
End: 2021-04-28

## 2021-04-28 LAB
CALCIUM 24H UR-MRATE: 9 MG/HR (ref 4–12)
CALCIUM UR-MCNC: 8.1 MG/DL (ref 0–15)
CALCIUM URINE (MG/SPEC): 219 MG/SPEC
CREAT 24H UR-MRATE: 36 MG/HR (ref 40–75)
CREAT UR-MCNC: 32 MG/DL (ref 15–325)
CREATININE, URINE (MG/SPEC): 864 MG/SPEC
URINE COLLECTION DURATION: 24 HR
URINE COLLECTION DURATION: 24 HR
URINE VOLUME: 2700 ML
URINE VOLUME: 2700 ML

## 2021-05-11 ENCOUNTER — NUTRITION (OUTPATIENT)
Dept: INTERNAL MEDICINE | Facility: CLINIC | Age: 76
End: 2021-05-11
Payer: MEDICARE

## 2021-05-11 DIAGNOSIS — E83.39 HYPERPHOSPHATEMIA: ICD-10-CM

## 2021-05-11 DIAGNOSIS — I12.9 HYPERTENSIVE CHRONIC KIDNEY DISEASE WITH STAGE 1 THROUGH STAGE 4 CHRONIC KIDNEY DISEASE, OR UNSPECIFIED CHRONIC KIDNEY DISEASE: ICD-10-CM

## 2021-05-11 PROCEDURE — 97802 PR MED NUTR THER, 1ST, INDIV, EA 15 MIN: ICD-10-PCS | Mod: S$GLB,,, | Performed by: DIETITIAN, REGISTERED

## 2021-05-11 PROCEDURE — 97802 MEDICAL NUTRITION INDIV IN: CPT | Mod: S$GLB,,, | Performed by: DIETITIAN, REGISTERED

## 2021-07-07 ENCOUNTER — PATIENT MESSAGE (OUTPATIENT)
Dept: NEPHROLOGY | Facility: CLINIC | Age: 76
End: 2021-07-07

## 2021-07-20 ENCOUNTER — OFFICE VISIT (OUTPATIENT)
Dept: NEPHROLOGY | Facility: CLINIC | Age: 76
End: 2021-07-20
Payer: MEDICARE

## 2021-07-20 ENCOUNTER — LAB VISIT (OUTPATIENT)
Dept: LAB | Facility: HOSPITAL | Age: 76
End: 2021-07-20
Payer: MEDICARE

## 2021-07-20 VITALS
BODY MASS INDEX: 36.64 KG/M2 | WEIGHT: 206.81 LBS | HEART RATE: 47 BPM | SYSTOLIC BLOOD PRESSURE: 110 MMHG | OXYGEN SATURATION: 97 % | DIASTOLIC BLOOD PRESSURE: 70 MMHG | HEIGHT: 63 IN

## 2021-07-20 DIAGNOSIS — E20.9 HYPOPARATHYROIDISM, UNSPECIFIED HYPOPARATHYROIDISM TYPE: ICD-10-CM

## 2021-07-20 DIAGNOSIS — R79.89 ELEVATED SERUM CREATININE: Primary | ICD-10-CM

## 2021-07-20 DIAGNOSIS — E83.51 HYPOCALCEMIA: ICD-10-CM

## 2021-07-20 DIAGNOSIS — N18.30 STAGE 3 CHRONIC KIDNEY DISEASE, UNSPECIFIED WHETHER STAGE 3A OR 3B CKD: Primary | ICD-10-CM

## 2021-07-20 DIAGNOSIS — R82.994 HYPERCALCIURIA: ICD-10-CM

## 2021-07-20 DIAGNOSIS — I12.9 HYPERTENSIVE CHRONIC KIDNEY DISEASE WITH STAGE 1 THROUGH STAGE 4 CHRONIC KIDNEY DISEASE, OR UNSPECIFIED CHRONIC KIDNEY DISEASE: ICD-10-CM

## 2021-07-20 DIAGNOSIS — E83.39 HYPERPHOSPHATEMIA: ICD-10-CM

## 2021-07-20 DIAGNOSIS — N18.30 STAGE 3 CHRONIC KIDNEY DISEASE, UNSPECIFIED WHETHER STAGE 3A OR 3B CKD: ICD-10-CM

## 2021-07-20 LAB
ALBUMIN SERPL BCP-MCNC: 4 G/DL (ref 3.5–5.2)
ANION GAP SERPL CALC-SCNC: 11 MMOL/L (ref 8–16)
BASOPHILS # BLD AUTO: 0.03 K/UL (ref 0–0.2)
BASOPHILS NFR BLD: 0.4 % (ref 0–1.9)
BUN SERPL-MCNC: 16 MG/DL (ref 8–23)
CALCIUM SERPL-MCNC: 10.6 MG/DL (ref 8.7–10.5)
CHLORIDE SERPL-SCNC: 100 MMOL/L (ref 95–110)
CO2 SERPL-SCNC: 31 MMOL/L (ref 23–29)
CREAT SERPL-MCNC: 1.2 MG/DL (ref 0.5–1.4)
DIFFERENTIAL METHOD: NORMAL
EOSINOPHIL # BLD AUTO: 0.1 K/UL (ref 0–0.5)
EOSINOPHIL NFR BLD: 1.3 % (ref 0–8)
ERYTHROCYTE [DISTWIDTH] IN BLOOD BY AUTOMATED COUNT: 13.2 % (ref 11.5–14.5)
EST. GFR  (AFRICAN AMERICAN): 51.1 ML/MIN/1.73 M^2
EST. GFR  (NON AFRICAN AMERICAN): 44.3 ML/MIN/1.73 M^2
GLUCOSE SERPL-MCNC: 111 MG/DL (ref 70–110)
HCT VFR BLD AUTO: 40.7 % (ref 37–48.5)
HGB BLD-MCNC: 13.8 G/DL (ref 12–16)
IMM GRANULOCYTES # BLD AUTO: 0.01 K/UL (ref 0–0.04)
IMM GRANULOCYTES NFR BLD AUTO: 0.1 % (ref 0–0.5)
LYMPHOCYTES # BLD AUTO: 1.9 K/UL (ref 1–4.8)
LYMPHOCYTES NFR BLD: 25.5 % (ref 18–48)
MCH RBC QN AUTO: 30.3 PG (ref 27–31)
MCHC RBC AUTO-ENTMCNC: 33.9 G/DL (ref 32–36)
MCV RBC AUTO: 90 FL (ref 82–98)
MONOCYTES # BLD AUTO: 0.5 K/UL (ref 0.3–1)
MONOCYTES NFR BLD: 6.1 % (ref 4–15)
NEUTROPHILS # BLD AUTO: 5 K/UL (ref 1.8–7.7)
NEUTROPHILS NFR BLD: 66.6 % (ref 38–73)
NRBC BLD-RTO: 0 /100 WBC
PHOSPHATE SERPL-MCNC: 4.7 MG/DL (ref 2.7–4.5)
PLATELET # BLD AUTO: 232 K/UL (ref 150–450)
PMV BLD AUTO: 10.3 FL (ref 9.2–12.9)
POTASSIUM SERPL-SCNC: 4.4 MMOL/L (ref 3.5–5.1)
PTH-INTACT SERPL-MCNC: <5 PG/ML (ref 9–77)
RBC # BLD AUTO: 4.55 M/UL (ref 4–5.4)
SODIUM SERPL-SCNC: 142 MMOL/L (ref 136–145)
WBC # BLD AUTO: 7.57 K/UL (ref 3.9–12.7)

## 2021-07-20 PROCEDURE — 1126F PR PAIN SEVERITY QUANTIFIED, NO PAIN PRESENT: ICD-10-PCS | Mod: CPTII,S$GLB,, | Performed by: NURSE PRACTITIONER

## 2021-07-20 PROCEDURE — 80069 RENAL FUNCTION PANEL: CPT | Performed by: NURSE PRACTITIONER

## 2021-07-20 PROCEDURE — 83970 ASSAY OF PARATHORMONE: CPT | Performed by: NURSE PRACTITIONER

## 2021-07-20 PROCEDURE — 1126F AMNT PAIN NOTED NONE PRSNT: CPT | Mod: CPTII,S$GLB,, | Performed by: NURSE PRACTITIONER

## 2021-07-20 PROCEDURE — 3074F PR MOST RECENT SYSTOLIC BLOOD PRESSURE < 130 MM HG: ICD-10-PCS | Mod: CPTII,S$GLB,, | Performed by: NURSE PRACTITIONER

## 2021-07-20 PROCEDURE — 1159F MED LIST DOCD IN RCRD: CPT | Mod: CPTII,S$GLB,, | Performed by: NURSE PRACTITIONER

## 2021-07-20 PROCEDURE — 85025 COMPLETE CBC W/AUTO DIFF WBC: CPT | Performed by: NURSE PRACTITIONER

## 2021-07-20 PROCEDURE — 3078F DIAST BP <80 MM HG: CPT | Mod: CPTII,S$GLB,, | Performed by: NURSE PRACTITIONER

## 2021-07-20 PROCEDURE — 36415 COLL VENOUS BLD VENIPUNCTURE: CPT | Performed by: NURSE PRACTITIONER

## 2021-07-20 PROCEDURE — 1159F PR MEDICATION LIST DOCUMENTED IN MEDICAL RECORD: ICD-10-PCS | Mod: CPTII,S$GLB,, | Performed by: NURSE PRACTITIONER

## 2021-07-20 PROCEDURE — 3074F SYST BP LT 130 MM HG: CPT | Mod: CPTII,S$GLB,, | Performed by: NURSE PRACTITIONER

## 2021-07-20 PROCEDURE — 99999 PR PBB SHADOW E&M-EST. PATIENT-LVL V: ICD-10-PCS | Mod: PBBFAC,,, | Performed by: NURSE PRACTITIONER

## 2021-07-20 PROCEDURE — 99499 RISK ADDL DX/OHS AUDIT: ICD-10-PCS | Mod: S$GLB,,, | Performed by: NURSE PRACTITIONER

## 2021-07-20 PROCEDURE — 99499 UNLISTED E&M SERVICE: CPT | Mod: S$GLB,,, | Performed by: NURSE PRACTITIONER

## 2021-07-20 PROCEDURE — 3078F PR MOST RECENT DIASTOLIC BLOOD PRESSURE < 80 MM HG: ICD-10-PCS | Mod: CPTII,S$GLB,, | Performed by: NURSE PRACTITIONER

## 2021-07-20 PROCEDURE — 99999 PR PBB SHADOW E&M-EST. PATIENT-LVL V: CPT | Mod: PBBFAC,,, | Performed by: NURSE PRACTITIONER

## 2021-07-20 PROCEDURE — 99214 OFFICE O/P EST MOD 30 MIN: CPT | Mod: S$GLB,,, | Performed by: NURSE PRACTITIONER

## 2021-07-20 PROCEDURE — 99214 PR OFFICE/OUTPT VISIT, EST, LEVL IV, 30-39 MIN: ICD-10-PCS | Mod: S$GLB,,, | Performed by: NURSE PRACTITIONER

## 2021-07-20 RX ORDER — POTASSIUM CHLORIDE 750 MG/1
10 TABLET, EXTENDED RELEASE ORAL
COMMUNITY
Start: 2021-05-03

## 2021-07-20 RX ORDER — PANTOPRAZOLE SODIUM 40 MG/1
TABLET, DELAYED RELEASE ORAL
COMMUNITY

## 2021-07-20 RX ORDER — METOPROLOL SUCCINATE 50 MG/1
25 TABLET, EXTENDED RELEASE ORAL
COMMUNITY

## 2021-07-20 RX ORDER — NAPROXEN SODIUM 220 MG/1
TABLET, FILM COATED ORAL
COMMUNITY

## 2021-07-20 RX ORDER — CLOPIDOGREL BISULFATE 75 MG/1
TABLET ORAL
COMMUNITY

## 2021-07-20 RX ORDER — TRAZODONE HYDROCHLORIDE 50 MG/1
50 TABLET ORAL NIGHTLY
COMMUNITY
Start: 2021-07-16

## 2021-07-20 RX ORDER — FUROSEMIDE 40 MG/1
TABLET ORAL
COMMUNITY
Start: 2021-05-03

## 2021-07-20 RX ORDER — ONDANSETRON 4 MG/1
TABLET, ORALLY DISINTEGRATING ORAL
COMMUNITY

## 2021-07-20 RX ORDER — OXYCODONE AND ACETAMINOPHEN 7.5; 325 MG/1; MG/1
1 TABLET ORAL EVERY 6 HOURS PRN
COMMUNITY
Start: 2021-07-06

## 2021-07-21 ENCOUNTER — PATIENT MESSAGE (OUTPATIENT)
Dept: NEPHROLOGY | Facility: CLINIC | Age: 76
End: 2021-07-21

## 2021-12-06 ENCOUNTER — TELEPHONE (OUTPATIENT)
Dept: NEPHROLOGY | Facility: CLINIC | Age: 76
End: 2021-12-06
Payer: MEDICARE

## 2021-12-23 ENCOUNTER — TELEPHONE (OUTPATIENT)
Dept: NEPHROLOGY | Facility: CLINIC | Age: 76
End: 2021-12-23
Payer: MEDICARE

## 2025-06-04 NOTE — PATIENT INSTRUCTIONS
Avoid high phosphorus foods such as nuts, cereals, dairy (milk, yoghurt, cheese, ice cream), sausages, meat.   
needed assist